# Patient Record
Sex: FEMALE | Race: WHITE | NOT HISPANIC OR LATINO
[De-identification: names, ages, dates, MRNs, and addresses within clinical notes are randomized per-mention and may not be internally consistent; named-entity substitution may affect disease eponyms.]

---

## 2020-10-05 ENCOUNTER — APPOINTMENT (OUTPATIENT)
Dept: COLORECTAL SURGERY | Facility: CLINIC | Age: 60
End: 2020-10-05

## 2020-10-27 ENCOUNTER — APPOINTMENT (OUTPATIENT)
Dept: COLORECTAL SURGERY | Facility: CLINIC | Age: 60
End: 2020-10-27

## 2020-11-05 ENCOUNTER — APPOINTMENT (OUTPATIENT)
Dept: COLORECTAL SURGERY | Facility: CLINIC | Age: 60
End: 2020-11-05
Payer: COMMERCIAL

## 2020-11-05 VITALS
HEART RATE: 74 BPM | SYSTOLIC BLOOD PRESSURE: 130 MMHG | BODY MASS INDEX: 21 KG/M2 | DIASTOLIC BLOOD PRESSURE: 60 MMHG | HEIGHT: 62 IN | OXYGEN SATURATION: 98 % | TEMPERATURE: 98.2 F | WEIGHT: 114.13 LBS

## 2020-11-05 PROCEDURE — 99213 OFFICE O/P EST LOW 20 MIN: CPT

## 2020-11-05 PROCEDURE — 99072 ADDL SUPL MATRL&STAF TM PHE: CPT

## 2020-11-05 RX ORDER — VALACYCLOVIR HYDROCHLORIDE 1 G/1
TABLET, FILM COATED ORAL
Refills: 0 | Status: ACTIVE | COMMUNITY

## 2020-11-05 NOTE — PHYSICAL EXAM
[FreeTextEntry1] : PE:\par \par AOA, looks well\par chest: CTA bilaterally,\par CV: s1s2, RRR\par abdomen: soft, flat, non tender. \par ext: no edema, no calf tenderness

## 2020-11-05 NOTE — HISTORY OF PRESENT ILLNESS
[FreeTextEntry1] : 60 year old female with  history of serrated adenoma in  February of 2018. \par \par No issues no change in bowel habits, no no bleeding , no bellly pain ,  no unexplained weight loss. \par \par

## 2020-11-14 ENCOUNTER — LABORATORY RESULT (OUTPATIENT)
Age: 60
End: 2020-11-14

## 2020-11-18 ENCOUNTER — APPOINTMENT (OUTPATIENT)
Dept: COLORECTAL SURGERY | Facility: AMBULATORY SURGERY CENTER | Age: 60
End: 2020-11-18
Payer: COMMERCIAL

## 2020-11-18 PROCEDURE — 45380 COLONOSCOPY AND BIOPSY: CPT

## 2020-12-14 ENCOUNTER — APPOINTMENT (OUTPATIENT)
Dept: INTERNAL MEDICINE | Facility: CLINIC | Age: 60
End: 2020-12-14
Payer: COMMERCIAL

## 2020-12-14 VITALS
HEIGHT: 62 IN | DIASTOLIC BLOOD PRESSURE: 64 MMHG | BODY MASS INDEX: 21.16 KG/M2 | WEIGHT: 115 LBS | TEMPERATURE: 97.6 F | OXYGEN SATURATION: 97 % | HEART RATE: 75 BPM | SYSTOLIC BLOOD PRESSURE: 130 MMHG

## 2020-12-14 DIAGNOSIS — Z78.9 OTHER SPECIFIED HEALTH STATUS: ICD-10-CM

## 2020-12-14 DIAGNOSIS — Z87.01 PERSONAL HISTORY OF PNEUMONIA (RECURRENT): ICD-10-CM

## 2020-12-14 DIAGNOSIS — Z82.49 FAMILY HISTORY OF ISCHEMIC HEART DISEASE AND OTHER DISEASES OF THE CIRCULATORY SYSTEM: ICD-10-CM

## 2020-12-14 DIAGNOSIS — O26.839 PREGNANCY RELATED RENAL DISEASE, UNSPECIFIED TRIMESTER: ICD-10-CM

## 2020-12-14 DIAGNOSIS — N20.0 PREGNANCY RELATED RENAL DISEASE, UNSPECIFIED TRIMESTER: ICD-10-CM

## 2020-12-14 DIAGNOSIS — Z82.3 FAMILY HISTORY OF STROKE: ICD-10-CM

## 2020-12-14 DIAGNOSIS — A60.00 HERPESVIRAL INFECTION OF UROGENITAL SYSTEM, UNSPECIFIED: ICD-10-CM

## 2020-12-14 PROCEDURE — 99072 ADDL SUPL MATRL&STAF TM PHE: CPT

## 2020-12-14 PROCEDURE — 99203 OFFICE O/P NEW LOW 30 MIN: CPT

## 2020-12-14 RX ORDER — SODIUM FLUORIDE 6 MG/ML
1.1 PASTE, DENTIFRICE DENTAL
Qty: 200 | Refills: 0 | Status: COMPLETED | COMMUNITY
Start: 2020-08-10

## 2020-12-14 RX ORDER — ZOLPIDEM TARTRATE 5 MG/1
5 TABLET ORAL
Qty: 30 | Refills: 0 | Status: COMPLETED | COMMUNITY
Start: 2020-11-17

## 2020-12-14 RX ORDER — POLYETHYLENE GLYCOL 3350, SODIUM CHLORIDE, SODIUM BICARBONATE AND POTASSIUM CHLORIDE WITH LEMON FLAVOR 420; 11.2; 5.72; 1.48 G/4L; G/4L; G/4L; G/4L
420 POWDER, FOR SOLUTION ORAL
Qty: 1 | Refills: 0 | Status: DISCONTINUED | COMMUNITY
Start: 2020-11-05 | End: 2020-12-14

## 2020-12-14 RX ORDER — PIMECROLIMUS 10 MG/G
1 CREAM TOPICAL
Qty: 30 | Refills: 0 | Status: COMPLETED | COMMUNITY
Start: 2020-09-24

## 2020-12-14 RX ORDER — METRONIDAZOLE 7.5 MG/G
0.75 CREAM TOPICAL
Qty: 45 | Refills: 0 | Status: COMPLETED | COMMUNITY
Start: 2020-09-24

## 2021-02-16 ENCOUNTER — TRANSCRIPTION ENCOUNTER (OUTPATIENT)
Age: 61
End: 2021-02-16

## 2021-02-16 ENCOUNTER — APPOINTMENT (OUTPATIENT)
Dept: INTERNAL MEDICINE | Facility: CLINIC | Age: 61
End: 2021-02-16
Payer: COMMERCIAL

## 2021-02-16 PROCEDURE — 99442: CPT

## 2021-02-16 RX ORDER — MULTIVITAMIN/IRON/FOLIC ACID 18MG-0.4MG
TABLET ORAL
Refills: 0 | Status: ACTIVE | COMMUNITY

## 2021-02-16 RX ORDER — CALCIUM CITRATE/VITAMIN D3 315MG-6.25
TABLET ORAL
Refills: 0 | Status: ACTIVE | COMMUNITY

## 2021-02-16 NOTE — HISTORY OF PRESENT ILLNESS
[Home] : at home, [unfilled] , at the time of the visit. [Medical Office: ([facility])___] : at the medical office located in  [Verbal consent obtained from patient] : the patient, [unfilled] [FreeTextEntry1] : review DEXA  [de-identified] : pt scheduled for Tele-video, but was unable to connect

## 2021-10-19 ENCOUNTER — NON-APPOINTMENT (OUTPATIENT)
Age: 61
End: 2021-10-19

## 2021-10-19 ENCOUNTER — INPATIENT (INPATIENT)
Facility: HOSPITAL | Age: 61
LOS: 1 days | Discharge: HOME | DRG: 342 | End: 2021-10-21
Attending: SURGERY | Admitting: SURGERY
Payer: COMMERCIAL

## 2021-10-19 ENCOUNTER — TRANSCRIPTION ENCOUNTER (OUTPATIENT)
Age: 61
End: 2021-10-19

## 2021-10-19 VITALS
DIASTOLIC BLOOD PRESSURE: 59 MMHG | TEMPERATURE: 98 F | SYSTOLIC BLOOD PRESSURE: 163 MMHG | OXYGEN SATURATION: 99 % | HEART RATE: 105 BPM | HEIGHT: 62 IN | WEIGHT: 110.01 LBS | RESPIRATION RATE: 20 BRPM

## 2021-10-19 LAB
ALBUMIN SERPL ELPH-MCNC: 4.5 G/DL — SIGNIFICANT CHANGE UP (ref 3.3–5)
ALP SERPL-CCNC: 77 U/L — SIGNIFICANT CHANGE UP (ref 40–120)
ALT FLD-CCNC: 13 U/L — SIGNIFICANT CHANGE UP (ref 10–45)
ANION GAP SERPL CALC-SCNC: 14 MMOL/L — SIGNIFICANT CHANGE UP (ref 5–17)
APPEARANCE UR: CLEAR — SIGNIFICANT CHANGE UP
APTT BLD: 30.2 SEC — SIGNIFICANT CHANGE UP (ref 27.5–35.5)
AST SERPL-CCNC: 24 U/L — SIGNIFICANT CHANGE UP (ref 10–40)
BACTERIA # UR AUTO: SIGNIFICANT CHANGE UP /HPF
BILIRUB SERPL-MCNC: 1.1 MG/DL — SIGNIFICANT CHANGE UP (ref 0.2–1.2)
BILIRUB UR-MCNC: NEGATIVE — SIGNIFICANT CHANGE UP
BUN SERPL-MCNC: 5 MG/DL — LOW (ref 7–23)
CALCIUM SERPL-MCNC: 9.3 MG/DL — SIGNIFICANT CHANGE UP (ref 8.4–10.5)
CHLORIDE SERPL-SCNC: 94 MMOL/L — LOW (ref 96–108)
CO2 SERPL-SCNC: 21 MMOL/L — LOW (ref 22–31)
COLOR SPEC: YELLOW — SIGNIFICANT CHANGE UP
CREAT SERPL-MCNC: 0.54 MG/DL — SIGNIFICANT CHANGE UP (ref 0.5–1.3)
DIFF PNL FLD: ABNORMAL
EPI CELLS # UR: SIGNIFICANT CHANGE UP /HPF (ref 0–5)
GLUCOSE SERPL-MCNC: 117 MG/DL — HIGH (ref 70–99)
GLUCOSE UR QL: NEGATIVE — SIGNIFICANT CHANGE UP
HCT VFR BLD CALC: 32 % — LOW (ref 34.5–45)
HGB BLD-MCNC: 11 G/DL — LOW (ref 11.5–15.5)
INR BLD: 1.08 — SIGNIFICANT CHANGE UP (ref 0.88–1.16)
KETONES UR-MCNC: >=80 MG/DL
LACTATE SERPL-SCNC: 1 MMOL/L — SIGNIFICANT CHANGE UP (ref 0.5–2)
LEUKOCYTE ESTERASE UR-ACNC: ABNORMAL
LIDOCAIN IGE QN: 13 U/L — SIGNIFICANT CHANGE UP (ref 7–60)
MCHC RBC-ENTMCNC: 32.4 PG — SIGNIFICANT CHANGE UP (ref 27–34)
MCHC RBC-ENTMCNC: 34.4 GM/DL — SIGNIFICANT CHANGE UP (ref 32–36)
MCV RBC AUTO: 94.1 FL — SIGNIFICANT CHANGE UP (ref 80–100)
NITRITE UR-MCNC: NEGATIVE — SIGNIFICANT CHANGE UP
NRBC # BLD: 0 /100 WBCS — SIGNIFICANT CHANGE UP (ref 0–0)
PH UR: 8 — SIGNIFICANT CHANGE UP (ref 5–8)
PLATELET # BLD AUTO: 242 K/UL — SIGNIFICANT CHANGE UP (ref 150–400)
POTASSIUM SERPL-MCNC: 4.2 MMOL/L — SIGNIFICANT CHANGE UP (ref 3.5–5.3)
POTASSIUM SERPL-SCNC: 4.2 MMOL/L — SIGNIFICANT CHANGE UP (ref 3.5–5.3)
PROT SERPL-MCNC: 7.6 G/DL — SIGNIFICANT CHANGE UP (ref 6–8.3)
PROT UR-MCNC: NEGATIVE MG/DL — SIGNIFICANT CHANGE UP
PROTHROM AB SERPL-ACNC: 12.9 SEC — SIGNIFICANT CHANGE UP (ref 10.6–13.6)
RBC # BLD: 3.4 M/UL — LOW (ref 3.8–5.2)
RBC # FLD: 11.8 % — SIGNIFICANT CHANGE UP (ref 10.3–14.5)
RBC CASTS # UR COMP ASSIST: < 5 /HPF — SIGNIFICANT CHANGE UP
SARS-COV-2 RNA SPEC QL NAA+PROBE: NEGATIVE — SIGNIFICANT CHANGE UP
SODIUM SERPL-SCNC: 129 MMOL/L — LOW (ref 135–145)
SP GR SPEC: 1.02 — SIGNIFICANT CHANGE UP (ref 1–1.03)
UROBILINOGEN FLD QL: 0.2 E.U./DL — SIGNIFICANT CHANGE UP
WBC # BLD: 15.75 K/UL — HIGH (ref 3.8–10.5)
WBC # FLD AUTO: 15.75 K/UL — HIGH (ref 3.8–10.5)
WBC UR QL: < 5 /HPF — SIGNIFICANT CHANGE UP

## 2021-10-19 PROCEDURE — 74177 CT ABD & PELVIS W/CONTRAST: CPT | Mod: 26,MG

## 2021-10-19 PROCEDURE — G1004: CPT

## 2021-10-19 PROCEDURE — 99285 EMERGENCY DEPT VISIT HI MDM: CPT

## 2021-10-19 RX ORDER — ACETAMINOPHEN 500 MG
650 TABLET ORAL ONCE
Refills: 0 | Status: DISCONTINUED | OUTPATIENT
Start: 2021-10-19 | End: 2021-10-19

## 2021-10-19 RX ORDER — METRONIDAZOLE 500 MG
500 TABLET ORAL ONCE
Refills: 0 | Status: COMPLETED | OUTPATIENT
Start: 2021-10-19 | End: 2021-10-19

## 2021-10-19 RX ORDER — CEFTRIAXONE 500 MG/1
1000 INJECTION, POWDER, FOR SOLUTION INTRAMUSCULAR; INTRAVENOUS ONCE
Refills: 0 | Status: COMPLETED | OUTPATIENT
Start: 2021-10-19 | End: 2021-10-19

## 2021-10-19 RX ORDER — ACETAMINOPHEN 500 MG
1000 TABLET ORAL ONCE
Refills: 0 | Status: DISCONTINUED | OUTPATIENT
Start: 2021-10-19 | End: 2021-10-20

## 2021-10-19 RX ORDER — HEPARIN SODIUM 5000 [USP'U]/ML
5000 INJECTION INTRAVENOUS; SUBCUTANEOUS EVERY 8 HOURS
Refills: 0 | Status: DISCONTINUED | OUTPATIENT
Start: 2021-10-19 | End: 2021-10-20

## 2021-10-19 RX ORDER — FAMOTIDINE 10 MG/ML
20 INJECTION INTRAVENOUS ONCE
Refills: 0 | Status: COMPLETED | OUTPATIENT
Start: 2021-10-19 | End: 2021-10-19

## 2021-10-19 RX ORDER — ACETAMINOPHEN 500 MG
650 TABLET ORAL ONCE
Refills: 0 | Status: COMPLETED | OUTPATIENT
Start: 2021-10-19 | End: 2021-10-19

## 2021-10-19 RX ORDER — SODIUM CHLORIDE 9 MG/ML
1000 INJECTION INTRAMUSCULAR; INTRAVENOUS; SUBCUTANEOUS ONCE
Refills: 0 | Status: COMPLETED | OUTPATIENT
Start: 2021-10-19 | End: 2021-10-19

## 2021-10-19 RX ORDER — ONDANSETRON 8 MG/1
4 TABLET, FILM COATED ORAL ONCE
Refills: 0 | Status: COMPLETED | OUTPATIENT
Start: 2021-10-19 | End: 2021-10-19

## 2021-10-19 RX ORDER — SODIUM CHLORIDE 9 MG/ML
1000 INJECTION INTRAMUSCULAR; INTRAVENOUS; SUBCUTANEOUS
Refills: 0 | Status: DISCONTINUED | OUTPATIENT
Start: 2021-10-19 | End: 2021-10-20

## 2021-10-19 RX ORDER — ONDANSETRON 8 MG/1
4 TABLET, FILM COATED ORAL EVERY 6 HOURS
Refills: 0 | Status: DISCONTINUED | OUTPATIENT
Start: 2021-10-19 | End: 2021-10-20

## 2021-10-19 RX ADMIN — Medication 650 MILLIGRAM(S): at 23:29

## 2021-10-19 RX ADMIN — FAMOTIDINE 20 MILLIGRAM(S): 10 INJECTION INTRAVENOUS at 18:13

## 2021-10-19 RX ADMIN — CEFTRIAXONE 1000 MILLIGRAM(S): 500 INJECTION, POWDER, FOR SOLUTION INTRAMUSCULAR; INTRAVENOUS at 23:37

## 2021-10-19 RX ADMIN — Medication 650 MILLIGRAM(S): at 19:15

## 2021-10-19 RX ADMIN — SODIUM CHLORIDE 1000 MILLILITER(S): 9 INJECTION INTRAMUSCULAR; INTRAVENOUS; SUBCUTANEOUS at 18:05

## 2021-10-19 RX ADMIN — Medication 650 MILLIGRAM(S): at 18:12

## 2021-10-19 RX ADMIN — SODIUM CHLORIDE 1000 MILLILITER(S): 9 INJECTION INTRAMUSCULAR; INTRAVENOUS; SUBCUTANEOUS at 19:05

## 2021-10-19 RX ADMIN — Medication 100 MILLIGRAM(S): at 23:37

## 2021-10-19 RX ADMIN — CEFTRIAXONE 100 MILLIGRAM(S): 500 INJECTION, POWDER, FOR SOLUTION INTRAMUSCULAR; INTRAVENOUS at 23:15

## 2021-10-19 RX ADMIN — Medication 650 MILLIGRAM(S): at 22:40

## 2021-10-19 RX ADMIN — ONDANSETRON 4 MILLIGRAM(S): 8 TABLET, FILM COATED ORAL at 18:13

## 2021-10-19 NOTE — ED PROVIDER NOTE - PROGRESS NOTE DETAILS
ct showed acute appy, abx given, pain controlled, preop ordered, surgery consulted. will admit dr momo DE LA TORRE

## 2021-10-19 NOTE — H&P ADULT - NSHPPHYSICALEXAM_GEN_ALL_CORE
Vital Signs Last 24 Hrs  T(C): 37.2 (19 Oct 2021 23:28), Max: 37.2 (19 Oct 2021 23:28)  T(F): 98.9 (19 Oct 2021 23:28), Max: 98.9 (19 Oct 2021 23:28)  HR: 87 (19 Oct 2021 23:28) (87 - 105)  BP: 147/82 (19 Oct 2021 23:28) (147/82 - 163/59)  BP(mean): --  RR: 16 (19 Oct 2021 21:50) (16 - 20)  SpO2: 98% (19 Oct 2021 21:50) (98% - 99%)  I&O's Detail    PHYSICAL EXAM:   General: NAD, resting comfortably in bed  C/V: NSR  Pulm: Nonlabored breathing, no respiratory distress  Abd: soft, mildly distended, moderately TTP in lower quadrants, moderate TTP at McBurney's point. Rebound +. No guarding.   Extrem: WWP, no edema  Skin: warm, no rashes  Psych: calm, appropriate

## 2021-10-19 NOTE — ED ADULT NURSE NOTE - NSIMPLEMENTINTERV_GEN_ALL_ED
History & Physical Examination     Patient Name: Hilton Ruiz  MRN: [id number]  CSN: [id number]  YOB: 1947     Diagnosis: REFRACTORY FREQUENCY, URGENCY WITH INCONTINENCE     Present Illness: REFRACTORY FREQUENCY, URGENCY WITH Jen Stoner Cancer Brother         Throat   • Hypertension Brother     • Cancer Brother         Prostate   • Hypertension Brother                        Smoking status: Never Smoker              Smokeless tobacco: Never Used            Alcohol use Yes     Comment: soc Implemented All Universal Safety Interventions:  [place] to call system. Call bell, personal items and telephone within reach. Instruct patient to call for assistance. Room bathroom lighting operational. Non-slip footwear when patient is off stretcher. Physically safe environment: no spills, clutter or unnecessary equipment. Stretcher in lowest position, wheels locked, appropriate side rails in place.

## 2021-10-19 NOTE — ED ADULT TRIAGE NOTE - CHIEF COMPLAINT QUOTE
Pt presents to ED C/O 4/10 R lower abdominal pain starting last night x 2 episodes of emesis and frequent watery stools. Pt referred to [facility] ER from Urgent Care for R/O appendicitis. Denies fever/ CP.

## 2021-10-19 NOTE — ED PROVIDER NOTE - NS_BEDUNITTYPES_ED_ALL_ED
-- restart symbicort    -- continue prednisone taper    -- tylenol with codeine for severe cough - limit use    -- if cough not improving - check chest xray - especially before weekend
REGIONAL

## 2021-10-19 NOTE — ED PROVIDER NOTE - PHYSICAL EXAMINATION
CONSTITUTIONAL: Well-appearing; in no apparent distress.   	HEAD: Normocephalic; atraumatic.   	EYES:  conjunctiva and sclera clear  	ENT: normal nose; no rhinorrhea; normal pharynx with no erythema or lesions.   	NECK: Supple; non-tender;   	CARDIOVASCULAR: Normal S1, S2; no murmurs, rubs, or gallops. Regular rate and rhythm.   	RESPIRATORY: Breathing easily; breath sounds clear and equal bilaterally; no wheezes, rhonchi, or rales.  	GI: No CVA tenderness. Mild LLQ tenderness. Severe RLQ tenderness. Mild guarding. No rebound.   	EXT: No cyanosis or edema; N/V intact  	SKIN: Normal for age and race; warm; dry; good turgor; no apparent lesions or rash.   	NEURO: A & O x 3; face symmetric; grossly unremarkable.   PSYCHOLOGICAL: The patient’s mood and manner are appropriate.

## 2021-10-19 NOTE — H&P ADULT - HISTORY OF PRESENT ILLNESS
HPI:   Alexandra Blank is a 61F pmh of HSV, osphena and psh of diagnostic laparoscopy who presents with 2 day history of abdominal pain with associated nausea with emesis. Reporting onset of jeffy-umbilical, crampy abdominal pain yesterday morning that was persistent with radiation to the pelvis and back. Also reports frequent BMs with abdominal pain. Pain persisted throughout today migrating to the RLQ with associated nausea and emesis x2; NBNB, and sought care at Urgent Care. Was recommended to go to the ED for concern of acute appendicitis. Has had multiple colonoscopies, last one was 2 years ago and notable for multiple polyps that were resected-precancerous, otherwise wnl. Denies f/c, CP, SOB, melena, hematochezia, dysuria, hematuria, weakness or pain in extremities.     In th ED initial vitals were notable for  and /59, otherwise remaining vitals wnl. Initial labs notable for WBC 15.7 and Na 129. CT A/P distended appendix measuring 1.4 cm with fat stranding and thickened walls and without collection or free air. Was bolused 1L NS.     PMH: HSV  PSH: Diagnostic laparoscopy  Allergies: codeine  Medications: Valacyclovir, osphena  SocHx: No h/o of tobacco use, occasional EtOH use.   FamHx: No h/o of CRC and IBD    heparin   Injectable 5000 Unit(s) SubCutaneous every 8 hours

## 2021-10-19 NOTE — H&P ADULT - NSHPLABSRESULTS_GEN_ALL_CORE
LABS:                        11.0   15.75 )-----------( 242      ( 19 Oct 2021 18:10 )             32.0     10-    129<L>  |  94<L>  |  5<L>  ----------------------------<  117<H>  4.2   |  21<L>  |  0.54    Ca    9.3      19 Oct 2021 18:10    TPro  7.6  /  Alb  4.5  /  TBili  1.1  /  DBili  x   /  AST  24  /  ALT  13  /  AlkPhos  77  10-19    PT/INR - ( 19 Oct 2021 23:16 )   PT: 12.9 sec;   INR: 1.08          PTT - ( 19 Oct 2021 23:16 )  PTT:30.2 sec  Urinalysis Basic - ( 19 Oct 2021 19:11 )    Color: Yellow / Appearance: Clear / S.020 / pH: x  Gluc: x / Ketone: >=80 mg/dL  / Bili: Negative / Urobili: 0.2 E.U./dL   Blood: x / Protein: NEGATIVE mg/dL / Nitrite: NEGATIVE   Leuk Esterase: Trace / RBC: < 5 /HPF / WBC < 5 /HPF   Sq Epi: x / Non Sq Epi: 0-5 /HPF / Bacteria: None /HPF        RADIOLOGY & ADDITIONAL STUDIES:  < from: CT Abdomen and Pelvis w/ IV Cont (10.19.21 @ 21:37) >      ******PRELIMINARY REPORT******    ******PRELIMINARY REPORT******            EXAM:  CT ABDOMEN AND PELVIS IC                          PROCEDURE DATE:  10/19/2021    ******PRELIMINARY REPORT******    ******PRELIMINARY REPORT******              INTERPRETATION:  CT SCAN OF ABDOMEN AND PELVIS    History: Right lower quadrant pain.    Technique: CT scan of abdomen and pelvis was performed from lung bases through symphysis pubis. Intravenous and oral contrast material were utilized. Axial, sagittal and coronal reformatted images were reviewed.    Comparison: None.    Findings:    Lower chest: Normal.    Liver:  Normal.    Gallbladder: No radiopaque stones gallbladder.    Spleen:  Normal.    Pancreas:  Normal.    Adrenal glands:  Normal.    Kidneys: Normal.    Adenopathy:  Right-sided mesenteric adenopathy.    Ascites: None.    Gastrointestinal tract: Thickened distended appendix measuring about 1.4 cm with thickened walls and filled with fluid and some air. There is significant surrounding fat-stranding. No collection or free air. No obstruction identified.    Vessels: Normal.    Pelvic organs: Distended bladder.    Soft tissues: Normal.    Bones: Normal.    Impression:    1.  Distended appendix measuring 1.4 cm with fat stranding and thickened walls and without collection or free air. Findings are consistent with acute uncomplicated appendicitis.  2.  Distended bladder.        Thank you for the opportunity to participate in the care of this patient.  ******PRELIMINARY REPORT******    ******PRELIMINARY REPORT******          KHALED AL TAWIL MD; Resident Radiologist

## 2021-10-19 NOTE — ED ADULT NURSE REASSESSMENT NOTE - NS ED NURSE REASSESS COMMENT FT1
PAtient requested Tylenol for 5/10 pain, see EMAR.  Surgery team at the bedside.
pt awaiting CT results.

## 2021-10-19 NOTE — ED PROVIDER NOTE - OBJECTIVE STATEMENT
60 y/o F, no PMHx. Ate soup dumplings for dinner last night at 8-9PM and at 2AM today developed abdominal pain and had 4-5 episodes of nonbloody, nonbilious vomiting throughout the day. Abdominal pain and nausea has since worsened. Pt is unable to keep down PO. She went to urgent care and was referred to the ED for evaluation of appendicitis. No fevers, chills, diarrhea, chest pain, SOB. No Recent travel or sick contacts.

## 2021-10-19 NOTE — ED PROVIDER NOTE - NS ED ROS FT
Other than symptoms associated with present events the following is reported:  General:  No fever, no chills, no weight loss.  HEENT:  No sore throat.  Respiratory: No cough, no dyspnea, no wheeze. no SOB.  Cardiovascular:  No chest pain, no palpitations, no orthopnea.  GI: + abdominal pain, nausea, vomiting. no diarrhea.   : No dysuria, no frequency, no urgency.  Musculoskeletal:  No joint pain, no myalgia.  Endocrine:  No generalized weakness, no polyuria.  Neurological:  No headache, no focal weakness.   Psychiatric: No emotional stress, no depression.  Derm:  No rash.  Heme:  No bruising, no bleeding.

## 2021-10-19 NOTE — H&P ADULT - ASSESSMENT
61F on HRT and pmh of HSV and psh of diagnostic laparoscopy who presents with 2 day history of abdominal pain with associated nausea with emesis. In th ED initial vitals were notable for  and /59, otherwise remaining vitals wnl. Initial labs notable for WBC 15.7 and Na 129. CT A/P distended appendix measuring 1.4 cm with fat stranding and thickened walls and without collection or free air. Clinical and radiographic findings c/w acute uncomplicated appendicitis.     Admit to surgery team 4 under Dr. Santiago  Add on for laparoscopic appendectomy  NPO/IVF NS @ 90cc/hr  CTX/Flagyl Stat  F/u AM labs  Analgesics PRN  Antiemetics PRN  F/u CXR  Stat T+S  VTE PPX with SCDs and SQH  Discussed with chief surgery resident

## 2021-10-19 NOTE — ED ADULT NURSE NOTE - OBJECTIVE STATEMENT
patient presented with abdominal pain since yesterday. stated she had about 25 bowel movement. Also stated she went to the urgent care center and was sent to the emergency room. will continue to monitor.

## 2021-10-19 NOTE — ED PROVIDER NOTE - CLINICAL SUMMARY MEDICAL DECISION MAKING FREE TEXT BOX
62 y/o F presents with abdominal pain, nausea, and multiple episodes of vomiting after eating soup dumplings last night. Will obtain labs, CT to r/o appendicitis vs gastroenteritis.

## 2021-10-20 ENCOUNTER — RESULT REVIEW (OUTPATIENT)
Age: 61
End: 2021-10-20

## 2021-10-20 DIAGNOSIS — Z98.890 OTHER SPECIFIED POSTPROCEDURAL STATES: Chronic | ICD-10-CM

## 2021-10-20 LAB
ANION GAP SERPL CALC-SCNC: 12 MMOL/L — SIGNIFICANT CHANGE UP (ref 5–17)
BLD GP AB SCN SERPL QL: NEGATIVE — SIGNIFICANT CHANGE UP
BUN SERPL-MCNC: 5 MG/DL — LOW (ref 7–23)
CALCIUM SERPL-MCNC: 8.8 MG/DL — SIGNIFICANT CHANGE UP (ref 8.4–10.5)
CHLORIDE SERPL-SCNC: 101 MMOL/L — SIGNIFICANT CHANGE UP (ref 96–108)
CO2 SERPL-SCNC: 21 MMOL/L — LOW (ref 22–31)
COVID-19 SPIKE DOMAIN AB INTERP: POSITIVE
COVID-19 SPIKE DOMAIN ANTIBODY RESULT: >250 U/ML — HIGH
CREAT SERPL-MCNC: 0.6 MG/DL — SIGNIFICANT CHANGE UP (ref 0.5–1.3)
GLUCOSE SERPL-MCNC: 89 MG/DL — SIGNIFICANT CHANGE UP (ref 70–99)
HCT VFR BLD CALC: 29.5 % — LOW (ref 34.5–45)
HCV AB S/CO SERPL IA: 0.04 S/CO — SIGNIFICANT CHANGE UP
HCV AB SERPL-IMP: SIGNIFICANT CHANGE UP
HGB BLD-MCNC: 10 G/DL — LOW (ref 11.5–15.5)
MAGNESIUM SERPL-MCNC: 2.2 MG/DL — SIGNIFICANT CHANGE UP (ref 1.6–2.6)
MCHC RBC-ENTMCNC: 32.5 PG — SIGNIFICANT CHANGE UP (ref 27–34)
MCHC RBC-ENTMCNC: 33.9 GM/DL — SIGNIFICANT CHANGE UP (ref 32–36)
MCV RBC AUTO: 95.8 FL — SIGNIFICANT CHANGE UP (ref 80–100)
NRBC # BLD: 0 /100 WBCS — SIGNIFICANT CHANGE UP (ref 0–0)
PHOSPHATE SERPL-MCNC: 3.2 MG/DL — SIGNIFICANT CHANGE UP (ref 2.5–4.5)
PLATELET # BLD AUTO: 222 K/UL — SIGNIFICANT CHANGE UP (ref 150–400)
POTASSIUM SERPL-MCNC: 4 MMOL/L — SIGNIFICANT CHANGE UP (ref 3.5–5.3)
POTASSIUM SERPL-SCNC: 4 MMOL/L — SIGNIFICANT CHANGE UP (ref 3.5–5.3)
RBC # BLD: 3.08 M/UL — LOW (ref 3.8–5.2)
RBC # FLD: 12.2 % — SIGNIFICANT CHANGE UP (ref 10.3–14.5)
RH IG SCN BLD-IMP: POSITIVE — SIGNIFICANT CHANGE UP
SARS-COV-2 IGG+IGM SERPL QL IA: >250 U/ML — HIGH
SARS-COV-2 IGG+IGM SERPL QL IA: POSITIVE
SODIUM SERPL-SCNC: 134 MMOL/L — LOW (ref 135–145)
WBC # BLD: 15.97 K/UL — HIGH (ref 3.8–10.5)
WBC # FLD AUTO: 15.97 K/UL — HIGH (ref 3.8–10.5)

## 2021-10-20 PROCEDURE — 99223 1ST HOSP IP/OBS HIGH 75: CPT

## 2021-10-20 PROCEDURE — 88304 TISSUE EXAM BY PATHOLOGIST: CPT | Mod: 26

## 2021-10-20 PROCEDURE — 71045 X-RAY EXAM CHEST 1 VIEW: CPT | Mod: 26

## 2021-10-20 RX ORDER — METRONIDAZOLE 500 MG
500 TABLET ORAL EVERY 8 HOURS
Refills: 0 | Status: DISCONTINUED | OUTPATIENT
Start: 2021-10-20 | End: 2021-10-20

## 2021-10-20 RX ORDER — HEPARIN SODIUM 5000 [USP'U]/ML
5000 INJECTION INTRAVENOUS; SUBCUTANEOUS EVERY 8 HOURS
Refills: 0 | Status: DISCONTINUED | OUTPATIENT
Start: 2021-10-20 | End: 2021-10-21

## 2021-10-20 RX ORDER — ONDANSETRON 8 MG/1
4 TABLET, FILM COATED ORAL EVERY 6 HOURS
Refills: 0 | Status: DISCONTINUED | OUTPATIENT
Start: 2021-10-20 | End: 2021-10-21

## 2021-10-20 RX ORDER — HYDROMORPHONE HYDROCHLORIDE 2 MG/ML
0.25 INJECTION INTRAMUSCULAR; INTRAVENOUS; SUBCUTANEOUS
Refills: 0 | Status: DISCONTINUED | OUTPATIENT
Start: 2021-10-20 | End: 2021-10-21

## 2021-10-20 RX ORDER — VALACYCLOVIR 500 MG/1
0 TABLET, FILM COATED ORAL
Qty: 0 | Refills: 0 | DISCHARGE

## 2021-10-20 RX ORDER — INFLUENZA VIRUS VACCINE 15; 15; 15; 15 UG/.5ML; UG/.5ML; UG/.5ML; UG/.5ML
0.5 SUSPENSION INTRAMUSCULAR ONCE
Refills: 0 | Status: DISCONTINUED | OUTPATIENT
Start: 2021-10-20 | End: 2021-10-21

## 2021-10-20 RX ORDER — ZALEPLON 10 MG
5 CAPSULE ORAL AT BEDTIME
Refills: 0 | Status: DISCONTINUED | OUTPATIENT
Start: 2021-10-20 | End: 2021-10-20

## 2021-10-20 RX ORDER — ZOLPIDEM TARTRATE 10 MG/1
5 TABLET ORAL AT BEDTIME
Refills: 0 | Status: DISCONTINUED | OUTPATIENT
Start: 2021-10-20 | End: 2021-10-21

## 2021-10-20 RX ORDER — METRONIDAZOLE 500 MG
500 TABLET ORAL EVERY 8 HOURS
Refills: 0 | Status: DISCONTINUED | OUTPATIENT
Start: 2021-10-20 | End: 2021-10-21

## 2021-10-20 RX ORDER — CEFTRIAXONE 500 MG/1
1000 INJECTION, POWDER, FOR SOLUTION INTRAMUSCULAR; INTRAVENOUS EVERY 24 HOURS
Refills: 0 | Status: DISCONTINUED | OUTPATIENT
Start: 2021-10-20 | End: 2021-10-21

## 2021-10-20 RX ORDER — LANOLIN ALCOHOL/MO/W.PET/CERES
5 CREAM (GRAM) TOPICAL AT BEDTIME
Refills: 0 | Status: DISCONTINUED | OUTPATIENT
Start: 2021-10-20 | End: 2021-10-20

## 2021-10-20 RX ORDER — ACETAMINOPHEN 500 MG
650 TABLET ORAL EVERY 6 HOURS
Refills: 0 | Status: DISCONTINUED | OUTPATIENT
Start: 2021-10-20 | End: 2021-10-20

## 2021-10-20 RX ORDER — OSPEMIFENE 60 MG/1
0 TABLET, FILM COATED ORAL
Qty: 0 | Refills: 0 | DISCHARGE

## 2021-10-20 RX ORDER — SODIUM CHLORIDE 9 MG/ML
1000 INJECTION, SOLUTION INTRAVENOUS
Refills: 0 | Status: DISCONTINUED | OUTPATIENT
Start: 2021-10-20 | End: 2021-10-20

## 2021-10-20 RX ORDER — HYDROMORPHONE HYDROCHLORIDE 2 MG/ML
0.25 INJECTION INTRAMUSCULAR; INTRAVENOUS; SUBCUTANEOUS
Refills: 0 | Status: DISCONTINUED | OUTPATIENT
Start: 2021-10-20 | End: 2021-10-20

## 2021-10-20 RX ORDER — OXYCODONE HYDROCHLORIDE 5 MG/1
5 TABLET ORAL EVERY 6 HOURS
Refills: 0 | Status: DISCONTINUED | OUTPATIENT
Start: 2021-10-20 | End: 2021-10-20

## 2021-10-20 RX ORDER — CEFTRIAXONE 500 MG/1
1000 INJECTION, POWDER, FOR SOLUTION INTRAMUSCULAR; INTRAVENOUS EVERY 24 HOURS
Refills: 0 | Status: CANCELLED | OUTPATIENT
Start: 2021-10-21 | End: 2021-10-20

## 2021-10-20 RX ORDER — ACETAMINOPHEN 500 MG
750 TABLET ORAL ONCE
Refills: 0 | Status: COMPLETED | OUTPATIENT
Start: 2021-10-20 | End: 2021-10-20

## 2021-10-20 RX ORDER — OXYCODONE HYDROCHLORIDE 5 MG/1
10 TABLET ORAL EVERY 6 HOURS
Refills: 0 | Status: DISCONTINUED | OUTPATIENT
Start: 2021-10-20 | End: 2021-10-20

## 2021-10-20 RX ORDER — ACETAMINOPHEN 500 MG
650 TABLET ORAL EVERY 6 HOURS
Refills: 0 | Status: DISCONTINUED | OUTPATIENT
Start: 2021-10-20 | End: 2021-10-21

## 2021-10-20 RX ADMIN — Medication 650 MILLIGRAM(S): at 19:15

## 2021-10-20 RX ADMIN — Medication 300 MILLIGRAM(S): at 07:47

## 2021-10-20 RX ADMIN — Medication 5 MILLIGRAM(S): at 01:58

## 2021-10-20 RX ADMIN — Medication 100 MILLIGRAM(S): at 06:24

## 2021-10-20 RX ADMIN — Medication 100 MILLIGRAM(S): at 21:01

## 2021-10-20 RX ADMIN — CEFTRIAXONE 100 MILLIGRAM(S): 500 INJECTION, POWDER, FOR SOLUTION INTRAMUSCULAR; INTRAVENOUS at 22:27

## 2021-10-20 RX ADMIN — Medication 750 MILLIGRAM(S): at 08:00

## 2021-10-20 RX ADMIN — Medication 650 MILLIGRAM(S): at 18:15

## 2021-10-20 RX ADMIN — SODIUM CHLORIDE 90 MILLILITER(S): 9 INJECTION INTRAMUSCULAR; INTRAVENOUS; SUBCUTANEOUS at 00:13

## 2021-10-20 RX ADMIN — HEPARIN SODIUM 5000 UNIT(S): 5000 INJECTION INTRAVENOUS; SUBCUTANEOUS at 00:13

## 2021-10-20 RX ADMIN — ZOLPIDEM TARTRATE 5 MILLIGRAM(S): 10 TABLET ORAL at 21:02

## 2021-10-20 RX ADMIN — HEPARIN SODIUM 5000 UNIT(S): 5000 INJECTION INTRAVENOUS; SUBCUTANEOUS at 06:24

## 2021-10-20 RX ADMIN — HEPARIN SODIUM 5000 UNIT(S): 5000 INJECTION INTRAVENOUS; SUBCUTANEOUS at 21:01

## 2021-10-20 NOTE — CONSULT NOTE ADULT - PROVIDER SPECIALTY LIST ADULT
Depo Provera 150mg given im ruoq without incident.  Pt tolerated injection well with no complaints.  Exp date -04/2022  Lot# [id number]  Pt instructed to return ~ 12/23/2020 for next injection.  Pt left ambulatory.   Hospitalist

## 2021-10-20 NOTE — CONSULT NOTE ADULT - SUBJECTIVE AND OBJECTIVE BOX
HPI:   Alexandra Blank is a 61F pmh of HSV, osphena and psh of diagnostic laparoscopy who presents with 2 day history of abdominal pain with associated nausea with emesis. Reporting onset of jeffy-umbilical, crampy abdominal pain the day before admission that was persistent with radiation to the pelvis and back. Also reports frequent BMs with abdominal pain. Pain persisted throughout the day, migrating to the RLQ with associated nausea and emesis x2; NBNB, and sought care at Urgent Care. Was recommended to go to the ED for concern of acute appendicitis. Has had multiple colonoscopies, last one was 2 years ago and notable for multiple polyps that were resected-precancerous, otherwise wnl. Denies f/c, CP, SOB, melena, hematochezia, dysuria, hematuria, weakness or pain in extremities.     In  ED initial vitals were notable for  and /59, otherwise remaining vitals wnl. Initial labs notable for WBC 15.7 and Na 129. CT A/P distended appendix measuring 1.4 cm with fat stranding and thickened walls and without collection or free air. Was bolused 1L NS.     At time of eval by internal medicine service today, was still having abdominal discomfort, awaiting transport for laparoscopic appendectomy.     PMH: HSV  PSH: Diagnostic laparoscopy  Allergies: codeine  Medications: Valacyclovir, osphena    heparin   Injectable 5000 Unit(s) SubCutaneous every 8 hours   (19 Oct 2021 23:42)      PAST MEDICAL & SURGICAL HISTORY:  HSV infection    History of laparoscopy  diagnostic        Home Medications:  Osphena:  (20 Oct 2021 00:00)  valACYclovir:  (20 Oct 2021 00:00)      Allergies    codeine (Vomiting)    Intolerances    FAMILY HISTORY:  FamHx: History of colorectal cancer in cousin - diagnosed at age 52; no colorectal cancer in father or mother     Social History:  No h/o of tobacco use, occasional EtOH use. (19 Oct 2021 23:42) (wine with dinner)  ; no recreational drug use      REVIEW OF SYSTEMS:  CONSTITUTIONAL: No weight loss  EYES: No eye pain, visual disturbances, or discharge  ENMT:  No difficulty hearing, tinnitus, vertigo; No throat pain  NECK: No pain or stiffness  RESPIRATORY: No cough, wheezing, or hemoptysis; No dyspnea  CARDIOVASCULAR: No chest pain, palpitations, dizziness, or leg swelling  GASTROINTESTINAL: Abdominal symptoms as per HPI.   GENITOURINARY: No dysuria, frequency, or hematuria  NEUROLOGICAL: No headaches, memory loss, numbness, or tremors  SKIN: No itching, burning, rashes, or lesions   LYMPH NODES: No enlarged glands  ENDOCRINE: No heat or cold intolerance;  MUSCULOSKELETAL: No joint pain or swelling;   HEME/LYMPH: No easy bruising, or bleeding gums  ALLERGY AND IMMUNOLOGIC: No hives or eczema    CURRENT MEDICATIONS:   acetaminophen   Tablet .. 650 milliGRAM(s) Oral every 6 hours PRN  cefTRIAXone   IVPB 1000 milliGRAM(s) IV Intermittent every 24 hours  heparin   Injectable 5000 Unit(s) SubCutaneous every 8 hours  HYDROmorphone  Injectable 0.25 milliGRAM(s) IV Push every 15 minutes PRN  influenza   Vaccine 0.5 milliLiter(s) IntraMuscular once  metroNIDAZOLE  IVPB 500 milliGRAM(s) IV Intermittent every 8 hours  ondansetron Injectable 4 milliGRAM(s) IV Push every 6 hours PRN  zolpidem 5 milliGRAM(s) Oral at bedtime PRN  zolpidem 5 milliGRAM(s) Oral at bedtime PRN      VITAL SIGNS, INS/OUTS (last 24 hours):  Vital Signs Last 24 Hrs  T(C): 36.8 (21 Oct 2021 00:45), Max: 37.6 (20 Oct 2021 05:07)  T(F): 98.2 (21 Oct 2021 00:45), Max: 99.6 (20 Oct 2021 05:07)  HR: 81 (21 Oct 2021 00:45) (80 - 107)  BP: 129/70 (21 Oct 2021 00:45) (105/57 - 140/69)  BP(mean): 89 (20 Oct 2021 15:30) (88 - 93)  RR: 15 (21 Oct 2021 00:45) (15 - 26)  SpO2: 97% (21 Oct 2021 00:45) (96% - 100%)  I&O's Summary    20 Oct 2021 07:01  -  21 Oct 2021 01:08  --------------------------------------------------------  IN: 0 mL / OUT: 650 mL / NET: -650 mL    PHYSICAL EXAM:  Gen: Reclining in bed at time of exam, appears stated age  HEENT: NCAT, MMM, clear OP  Neck: supple, trachea at midline  CV: RRR, +S1/S2  Pulm: adequate respiratory effort, no increase in work of breathing  Abd: soft, ND; non-obese;  most tender in RLQ  Skin: warm and dry, no new rashes vs prior report  Ext: WWP, no LE edema  Neuro: AOx3, no gross focal neurological deficits  Psych: affect and behavior appropriate, pleasant at time of interview    BASIC LABS:                        10.0   15.97 )-----------( 222      ( 20 Oct 2021 08:25 )             29.5     10-20    134<L>  |  101  |  5<L>  ----------------------------<  89  4.0   |  21<L>  |  0.60    Ca    8.8      20 Oct 2021 08:25  Phos  3.2     10-20  Mg     2.2     10-20    TPro  7.6  /  Alb  4.5  /  TBili  1.1  /  DBili  x   /  AST  24  /  ALT  13  /  AlkPhos  77  10-19    PT/INR - ( 19 Oct 2021 23:16 )   PT: 12.9 sec;   INR: 1.08          PTT - ( 19 Oct 2021 23:16 )  PTT:30.2 sec  Urinalysis Basic - ( 19 Oct 2021 19:11 )    Color: Yellow / Appearance: Clear / S.020 / pH: x  Gluc: x / Ketone: >=80 mg/dL  / Bili: Negative / Urobili: 0.2 E.U./dL   Blood: x / Protein: NEGATIVE mg/dL / Nitrite: NEGATIVE   Leuk Esterase: Trace / RBC: < 5 /HPF / WBC < 5 /HPF   Sq Epi: x / Non Sq Epi: 0-5 /HPF / Bacteria: None /HPF      CAPILLARY BLOOD GLUCOSE          OTHER LABS:        MICRODATA:    Culture - Blood (collected 20 Oct 2021 01:29)  Source: .Blood Blood  Preliminary Report (20 Oct 2021 14:00):    No growth at 12 hours    Culture - Blood (collected 20 Oct 2021 01:29)  Source: .Blood Blood  Preliminary Report (20 Oct 2021 14:00):    No growth at 12 hours        IMAGING:    EKG:    #Diet - Diet, Clear Liquid (10-20-21 @ 15:06) [Active]        #DVT PPx -  #Dispo -

## 2021-10-20 NOTE — PROGRESS NOTE ADULT - ASSESSMENT
61F on HRT , psh diagnostic lap for ovarian cyst, a/w acute uncomplicated appendicitis. Pt with expected abdominal tenderness when examined this AM, with plan for lap appy later today 10/20     Add on for laparoscopic appendectomy  NPO/IVF   CTX/Flagyl   F/u AM labs  Analgesics PRN  Antiemetics PRN  F/u CXR  VTE PPX with SCDs and SQH

## 2021-10-20 NOTE — CONSULT NOTE ADULT - PROBLEM SELECTOR RECOMMENDATION 4
Serum Na 129 at time of admission.   likely hypovolemic hyponatremia 2/2 poor PO intake, in the setting of acute appendicitis .   IVF for volume repletion.

## 2021-10-20 NOTE — PACU DISCHARGE NOTE - COMMENTS
Patient met PACU criteria. Vital signs stable. Patient comfortable while in PACU, no pain medication given. Lap sites X 3 clean and dry. Patient due to void by 9pm. Patient transported to [address] [place] [address] escorted by nursing assistants X 2

## 2021-10-20 NOTE — BRIEF OPERATIVE NOTE - OPERATION/FINDINGS
Abdomen accessed and insufflated with Veress needle. Infraumbilical cutdown with 12mm port. Additional two 5mm ports placed under direct visualization. Appendix gangrenous, laying in RLQ. Mesoderm dissected to appendiceal base and appendix take with RASHAUN stapler. Placed in EndoCatch and retrieved through infraumbilical incision. Abdomen irrigated and drained of pelvic fluid collections. Fascia closed. Skin closed. Sutures covered in dermabond.

## 2021-10-20 NOTE — CONSULT NOTE ADULT - PROBLEM SELECTOR RECOMMENDATION 3
# Pre-operative evaluation   METS >4 , able to walk up 1 flight of stairs without stopping.   RCRI Class II Risk  Using the MIRAMONTES jeffy-operative risk index, patient has a <1% risk of myocardial infarction or cardiac arrest, intraoperatively or up to 30 days post-op  Assuming laparoscopic appendectomy, patient is at low risk for an intermediate risk procedure.   Patient may proceed to surgery without further cardiac evaluation if surgery is indicated.     Please notify co-management hospitalist if patient's clinical status changes.

## 2021-10-20 NOTE — CONSULT NOTE ADULT - PROBLEM SELECTOR RECOMMENDATION 9
Clinical presentation and imaging consistent with acute appendicitis  Pending laparoscopic appendectomy.   Agree with empiric antibiotics   Rest of management per primary team.    post-operative care:   OOTB to chair   Encourage ambulation   Encourage incentive spirometry   Recommend miralax qd + senna PRN if patient develops constipation post-op

## 2021-10-20 NOTE — CONSULT NOTE ADULT - PROBLEM SELECTOR RECOMMENDATION 2
# Sepsis (present on admission)  At time of arrival to ED, met at least 2/4 SIRS criteria. WBCs 15.  bpm  Source: Likely 2/2 acute appendicitis  Improved with treatment of acute appendicitis as above

## 2021-10-20 NOTE — CONSULT NOTE ADULT - ASSESSMENT
61 year old woman with history of HSV, PSHx of diagnostic laparoscopy, who presented with abdominal pain. admitted for acute appendictis. Pending appendectomy.

## 2021-10-21 ENCOUNTER — TRANSCRIPTION ENCOUNTER (OUTPATIENT)
Age: 61
End: 2021-10-21

## 2021-10-21 VITALS — WEIGHT: 110.01 LBS

## 2021-10-21 DIAGNOSIS — Z01.818 ENCOUNTER FOR OTHER PREPROCEDURAL EXAMINATION: ICD-10-CM

## 2021-10-21 DIAGNOSIS — A41.9 SEPSIS, UNSPECIFIED ORGANISM: ICD-10-CM

## 2021-10-21 DIAGNOSIS — K35.80 UNSPECIFIED ACUTE APPENDICITIS: ICD-10-CM

## 2021-10-21 DIAGNOSIS — E87.1 HYPO-OSMOLALITY AND HYPONATREMIA: ICD-10-CM

## 2021-10-21 LAB
ANION GAP SERPL CALC-SCNC: 8 MMOL/L — SIGNIFICANT CHANGE UP (ref 5–17)
BUN SERPL-MCNC: 6 MG/DL — LOW (ref 7–23)
CALCIUM SERPL-MCNC: 8.4 MG/DL — SIGNIFICANT CHANGE UP (ref 8.4–10.5)
CHLORIDE SERPL-SCNC: 100 MMOL/L — SIGNIFICANT CHANGE UP (ref 96–108)
CO2 SERPL-SCNC: 22 MMOL/L — SIGNIFICANT CHANGE UP (ref 22–31)
CREAT SERPL-MCNC: 0.58 MG/DL — SIGNIFICANT CHANGE UP (ref 0.5–1.3)
GLUCOSE SERPL-MCNC: 114 MG/DL — HIGH (ref 70–99)
HCT VFR BLD CALC: 26 % — LOW (ref 34.5–45)
HGB BLD-MCNC: 9 G/DL — LOW (ref 11.5–15.5)
MAGNESIUM SERPL-MCNC: 2 MG/DL — SIGNIFICANT CHANGE UP (ref 1.6–2.6)
MCHC RBC-ENTMCNC: 33.5 PG — SIGNIFICANT CHANGE UP (ref 27–34)
MCHC RBC-ENTMCNC: 34.6 GM/DL — SIGNIFICANT CHANGE UP (ref 32–36)
MCV RBC AUTO: 96.7 FL — SIGNIFICANT CHANGE UP (ref 80–100)
NRBC # BLD: 0 /100 WBCS — SIGNIFICANT CHANGE UP (ref 0–0)
PHOSPHATE SERPL-MCNC: 2.6 MG/DL — SIGNIFICANT CHANGE UP (ref 2.5–4.5)
PLATELET # BLD AUTO: 184 K/UL — SIGNIFICANT CHANGE UP (ref 150–400)
POTASSIUM SERPL-MCNC: 3.8 MMOL/L — SIGNIFICANT CHANGE UP (ref 3.5–5.3)
POTASSIUM SERPL-SCNC: 3.8 MMOL/L — SIGNIFICANT CHANGE UP (ref 3.5–5.3)
RBC # BLD: 2.69 M/UL — LOW (ref 3.8–5.2)
RBC # FLD: 12 % — SIGNIFICANT CHANGE UP (ref 10.3–14.5)
SODIUM SERPL-SCNC: 130 MMOL/L — LOW (ref 135–145)
WBC # BLD: 12.34 K/UL — HIGH (ref 3.8–10.5)
WBC # FLD AUTO: 12.34 K/UL — HIGH (ref 3.8–10.5)

## 2021-10-21 RX ORDER — METRONIDAZOLE 500 MG
1 TABLET ORAL
Qty: 15 | Refills: 0
Start: 2021-10-21 | End: 2021-10-25

## 2021-10-21 RX ORDER — POTASSIUM CHLORIDE 20 MEQ
20 PACKET (EA) ORAL ONCE
Refills: 0 | Status: COMPLETED | OUTPATIENT
Start: 2021-10-21 | End: 2021-10-21

## 2021-10-21 RX ORDER — CEFPODOXIME PROXETIL 100 MG
1 TABLET ORAL
Qty: 10 | Refills: 0
Start: 2021-10-21 | End: 2021-10-25

## 2021-10-21 RX ADMIN — Medication 650 MILLIGRAM(S): at 10:18

## 2021-10-21 RX ADMIN — HEPARIN SODIUM 5000 UNIT(S): 5000 INJECTION INTRAVENOUS; SUBCUTANEOUS at 05:26

## 2021-10-21 RX ADMIN — Medication 100 MILLIGRAM(S): at 13:46

## 2021-10-21 RX ADMIN — Medication 650 MILLIGRAM(S): at 11:18

## 2021-10-21 RX ADMIN — Medication 650 MILLIGRAM(S): at 00:53

## 2021-10-21 RX ADMIN — ZOLPIDEM TARTRATE 5 MILLIGRAM(S): 10 TABLET ORAL at 00:54

## 2021-10-21 RX ADMIN — Medication 100 MILLIGRAM(S): at 05:30

## 2021-10-21 RX ADMIN — Medication 650 MILLIGRAM(S): at 01:53

## 2021-10-21 RX ADMIN — Medication 20 MILLIEQUIVALENT(S): at 10:19

## 2021-10-21 NOTE — DISCHARGE NOTE PROVIDER - CARE PROVIDER_API CALL
Krishna Morrow  SURGERY  [address] [place] [address]  Phone: [phone]  Fax: [phone]  Follow Up Time:

## 2021-10-21 NOTE — DISCHARGE NOTE NURSING/CASE MANAGEMENT/SOCIAL WORK - PATIENT PORTAL LINK FT
You can access the FollowMyHealth Patient Portal offered by [facility] by registering at the following website: [email or web address]. By joining [facility]’s FollowMyHealth portal, you will also be able to view your health information using other applications (apps) compatible with our system.

## 2021-10-21 NOTE — PROGRESS NOTE ADULT - ASSESSMENT
61F on hormone replacement therapy, psh diagnostic lap for ovarian cyst, a/w acute uncomplicated appendicitis. Now S/P lap appy 10/20.    Regular diet  F/u AM labs  CTX/flagyl  Analgesics PRN  Antiemetics PRN  VTE PPX with SCDs and SQH  DC today

## 2021-10-21 NOTE — DIETITIAN INITIAL EVALUATION ADULT. - ADD RECOMMEND
1. Continue with regular diet. Monitor %PO intake, diet tolerance. 2. Monitor skin/wts. 3. Pain and bowel regimen per team. 4. Monitor lytes and replete prn. 5. RD diet edu reinforcement prn.

## 2021-10-21 NOTE — DIETITIAN INITIAL EVALUATION ADULT. - OTHER INFO
61y F with PMHx of HSV, and Sx history of diagnostic lap for ovarian cyst present to [facility] with appendicitis after experiencing 2 days of abdominal pain w/ n/v. Now s/p appendectomy 10/20 abdominal pain and discomfort significantly improved. Pt pending medical clearance for d/c home.      On assessment, pt resting comfortably in her bed. Pt on regular diet and reports good PO intake, approximately eating 50-75% of her meals. No n/v reported. NKFA. Skin: sx incision on abdomen, Baldev 21. PTA pt reported good PO intake until abdominal pain began 2 days before admission. Pt reports usually eating 3x a day, mostly vegetarian, and includes vegetarian protein sources such as tofu and tempeh in her diet. Consumes fish and chicken, mostly on the weekends. UBW is 113-115, noting a 3–5-pound wt loss likely 2/2 abdominal pain and discomfort (3-4% wt loss, insignificant). Encouraged pt to go back to eating as usual/general healthy diet edu. pt was receptive. Please see nutrition recommendations below. 61y F with PMHx of HSV, and Sx history of diagnostic lap for ovarian cyst present to [facility] with appendicitis after experiencing 2 days of abdominal pain w/ n/v/Now s/p appendectomy 10/20 abdominal pain and discomfort significantly improved. Pt pending medical clearance for d/c home.      On assessment, pt resting comfortably in her bed. Pt on regular diet and reports good PO intake, approximately eating 50-75% of her meals. No n/vd. Last BM PTA - currently on bowel regimen. NKFA. Skin: sx incision on abdomen, Baldev 21. PTA pt reported good PO intake until abdominal pain began 2 days before admission. Per diet recall, pt had significant decrease of PO intake x2 days PTA. Pt reports usually eating 3x a day, mostly vegetarian, and includes vegetarian protein sources such as tofu and tempeh in her diet. Consumes fish and chicken, mostly on the weekends. UBW is 113-115, noting a 3–5-pound wt loss likely 2/2 abdominal pain and discomfort (3-4% wt loss < 1 week). NFPE unremarkable. Per ASPEN guidelines, meets criteria for mild malnutrition. Encouraged pt to go back to eating as usual/general healthy diet edu. pt was receptive. Please see nutrition recommendations below.

## 2021-10-21 NOTE — DISCHARGE NOTE PROVIDER - HOSPITAL COURSE
Alexandra Blank is a 61F pmh of HSV, osphena and psh of diagnostic laparoscopy who presents with 2 day history of abdominal pain with associated nausea with emesis. In the ED initial vitals were notable for  and /59, otherwise remaining vitals wnl. Initial labs notable for WBC 15.7 and Na 129. CT A/P distended appendix measuring 1.4 cm with fat stranding and thickened walls and without collection or free air. Was bolused 1L NS. On 10/20 pt was taken to the OR for lap appy, appendix was noted to be gangrenous but non perforated. Her postoperative course was unremarkable with advancement of diet, passing trial of void, and pain control. On day of discharge patient was stable to be d/c'd home with 5 more days of PO abx.     Alexandra Blank is a 61F pmh of HSV, osphena and psh of diagnostic laparoscopy who presents with 2 day history of abdominal pain with associated nausea with emesis. In the ED initial vitals were notable for  and /59, otherwise remaining vitals wnl. Initial labs notable for WBC 15.7 and Na 129. CT A/P distended appendix measuring 1.4 cm with fat stranding and thickened walls and without collection or free air. Was bolused 1L NS. On 10/20 pt was taken to the OR for lap appy, appendix was noted to be gangrenous but non perforated. Her postoperative course was unremarkable with advancement of diet, passing trial of void, and pain control. On day of discharge patient was stable to be d/c'd home with 5 more days of PO abx. This patient presented with clinical picture of acute suppurative appendicitis which correlated with intraoperative findings. Systemic sepsis was not present on admission based on clinical determination.

## 2021-10-21 NOTE — DISCHARGE NOTE PROVIDER - NSDCFUADDINST_GEN_ALL_CORE_FT
Detail Level: Zone Otc Regimen: [place] soaks daily\\nMineral oil daily Please follow up with Dr. Morrow in one week; you may call the office to make an appointment at your earliest convenience. You have been prescribed oral antibiotics. Please be sure to complete the entire course as directed. You may take tylenol every 6 hours as needed for pain, do not exceed 4,000mg of tylenol in 24 hours.    General Discharge Instructions:  Please resume all regular home medications unless specifically advised not to take a particular medication. Also, please take any new medications as prescribed.  Please get plenty of rest, continue to ambulate several times per day, and drink adequate amounts of fluids. Avoid lifting weights greater than 5-10 lbs until you follow-up with your surgeon, who will instruct you further regarding activity restrictions.  Please follow-up with your surgeon and Primary Care Provider (PCP) as advised.  Incision Care:  *Please call your doctor if you have increased pain, swelling, redness, or drainage from the incision site.  *Avoid swimming and baths until your follow-up appointment.  *You may shower, and wash surgical incisions with a mild soap and warm water. Gently pat the area dry.  *If you have steri-strips, they will fall off on their own. Please remove any remaining strips 7-10 days after surgery.    Warning Signs:  Please call your doctor if you experience the following:  *You experience new chest pain, pressure, squeezing or tightness.  *New or worsening cough, shortness of breath, or wheeze.  *If you are vomiting and cannot keep down fluids or your medications.  *You are getting dehydrated due to continued vomiting, diarrhea, or other reasons. Signs of dehydration include dry mouth, rapid heartbeat, or feeling dizzy or faint when standing.  *You see blood or dark/black material when you vomit or have a bowel movement.  *You experience burning when you urinate, have blood in your urine, or experience a discharge.  *Your pain is not improving within 8-12 hours or is not gone within 24 hours. Call or return immediately if your pain is getting worse, changes location, or moves to your chest or back.  *You have shaking chills, or fever greater than 101.5 degrees Fahrenheit or 38 degrees Celsius.  *Any change in your symptoms, or any new symptoms that concern you.     Please follow up with Dr. Morrow in one week; you may call the office to make an appointment at your earliest convenience. You have been prescribed oral antibiotics. Please be sure to complete the entire course as directed. You may take tylenol every 6 hours as needed for pain, do not exceed 4,000mg of tylenol in 24 hours.    Primary Care Follow Up:  Please follow up with your primary care doctor, Dr. Mayorga, in 2 weeks regarding the lung granulomas that you noted on your chest xray; you may call the office to make an appointment at your earliest convenience.      General Discharge Instructions:  Please resume all regular home medications unless specifically advised not to take a particular medication. Also, please take any new medications as prescribed.  Please get plenty of rest, continue to ambulate several times per day, and drink adequate amounts of fluids. Avoid lifting weights greater than 5-10 lbs until you follow-up with your surgeon, who will instruct you further regarding activity restrictions.  Please follow-up with your surgeon and Primary Care Provider (PCP) as advised.  Incision Care:  *Please call your doctor if you have increased pain, swelling, redness, or drainage from the incision site.  *Avoid swimming and baths until your follow-up appointment.  *You may shower, and wash surgical incisions with a mild soap and warm water. Gently pat the area dry.  *If you have steri-strips, they will fall off on their own. Please remove any remaining strips 7-10 days after surgery.    Warning Signs:  Please call your doctor if you experience the following:  *You experience new chest pain, pressure, squeezing or tightness.  *New or worsening cough, shortness of breath, or wheeze.  *If you are vomiting and cannot keep down fluids or your medications.  *You are getting dehydrated due to continued vomiting, diarrhea, or other reasons. Signs of dehydration include dry mouth, rapid heartbeat, or feeling dizzy or faint when standing.  *You see blood or dark/black material when you vomit or have a bowel movement.  *You experience burning when you urinate, have blood in your urine, or experience a discharge.  *Your pain is not improving within 8-12 hours or is not gone within 24 hours. Call or return immediately if your pain is getting worse, changes location, or moves to your chest or back.  *You have shaking chills, or fever greater than 101.5 degrees Fahrenheit or 38 degrees Celsius.  *Any change in your symptoms, or any new symptoms that concern you.

## 2021-10-21 NOTE — DIETITIAN INITIAL EVALUATION ADULT. - OTHER CALCULATIONS
%IBW = 100%; ABW used to calculate estimated needs due to pt's ABW being between 80% and 120% of IBW. %IBW = 100%; ABW used to calculate estimated needs due to pt's ABW being between 80% and 120% of IBW; needs slightly adjusted for mild malnutrition

## 2021-10-21 NOTE — DIETITIAN INITIAL EVALUATION ADULT. - PERTINENT LABORATORY DATA
Hemoglobin: 9.0 (L)   Hematocrit: 26.0 (L)  Na, serum: 130 (L)   BUN: 6 (L)   Glucose, serum: 114 (H)

## 2021-10-21 NOTE — DISCHARGE NOTE PROVIDER - NSDCMRMEDTOKEN_GEN_ALL_CORE_FT
cefpodoxime 200 mg oral tablet: 1 tab(s) orally 2 times a day   metroNIDAZOLE 500 mg oral tablet: 1 tab(s) orally every 8 hours   Osphena:   valACYclovir:    cefpodoxime 200 mg oral tablet: 1 tab(s) orally 2 times a day   COVID PCR NASAL SWAB: COVID PCR NASAL SWAB  metroNIDAZOLE 500 mg oral tablet: 1 tab(s) orally every 8 hours   Osphena:   valACYclovir:

## 2021-10-21 NOTE — DIETITIAN NUTRITION RISK NOTIFICATION - ADDITIONAL COMMENTS/DIETITIAN RECOMMENDATIONS
1. Cont with regular diet  2. Pain and bowel regimen per team  3. Cont to monitor lytes and replete prn  4. RD diet edu prn

## 2021-10-21 NOTE — DISCHARGE NOTE PROVIDER - DETAILS OF MALNUTRITION DIAGNOSIS/DIAGNOSES
This patient has been assessed with a concern for Malnutrition and was treated during this hospitalization for the following Nutrition diagnosis/diagnoses:     -  10/21/2021: Mild protein-calorie malnutrition

## 2021-10-21 NOTE — PROGRESS NOTE ADULT - SUBJECTIVE AND OBJECTIVE BOX
Procedure: Laparoscopic appendectomy    Diagnosis/Indication: Acute cholecystitis    Surgeon: Cristhian    S: Pt has no complaints. Denies CP, SOB, GIBSON, calf tenderness. Pain controlled with medication. States that abdominal discomfort and pain from this AM significantly improved after surgery    O:  T(C): 36.3 (10-20-21 @ 16:35), Max: 36.8 (10-20-21 @ 14:45)  T(F): 97.4 (10-20-21 @ 16:35), Max: 98.3 (10-20-21 @ 14:45)  HR: 96 (10-20-21 @ 16:35) (92 - 107)  BP: 136/72 (10-20-21 @ 16:35) (129/63 - 137/65)  RR: 17 (10-20-21 @ 16:35) (17 - 26)  SpO2: 96% (10-20-21 @ 16:35) (96% - 100%)  Wt(kg): --                        10.0   15.97 )-----------( 222      ( 20 Oct 2021 08:25 )             29.5     10-20    134<L>  |  101  |  5<L>  ----------------------------<  89  4.0   |  21<L>  |  0.60    Ca    8.8      20 Oct 2021 08:25  Phos  3.2     10-20  Mg     2.2     10-20    TPro  7.6  /  Alb  4.5  /  TBili  1.1  /  DBili  x   /  AST  24  /  ALT  13  /  AlkPhos  77  10-19      Gen: NAD, resting comfortably in bed  C/V: NSR  Pulm: Nonlabored breathing, no respiratory distress  Abd: soft, NT/ND, incision c/d/i with dermabond   Extrem: WWP, no calf edema, SCDs in place      A/P: 61yFemale s/p above procedure  Diet: CLD  IVF: LR 90  Pain/nausea control  DVT ppx: scds, sqh  Dispo plan: d/c home tmrw 
  Patient seen and examined bedside with chief resident, - flatus, + BM, - F/C/N/V    metroNIDAZOLE  IVPB 500 milliGRAM(s) IV Intermittent every 8 hours      Vital Signs Last 24 Hrs  T(C): 37.6 (20 Oct 2021 05:07), Max: 37.6 (20 Oct 2021 05:07)  T(F): 99.6 (20 Oct 2021 05:07), Max: 99.6 (20 Oct 2021 05:07)  HR: 97 (20 Oct 2021 05:07) (87 - 105)  BP: 105/57 (20 Oct 2021 05:07) (105/57 - 163/59)  BP(mean): --  RR: 17 (20 Oct 2021 05:07) (16 - 20)  SpO2: 97% (20 Oct 2021 05:07) (97% - 99%)    I&O's Detail    Physical Exam  General: NAD, resting comfortably in bed  C/V: NSR  Pulm: Nonlabored breathing, no respiratory distress  Abd: soft, mildly distended, moderately TTP in RLQ and LLQ, no guarding   Extrem: WWP, no edema  Skin: warm, no rashes  Psych: calm, appropriate    
GENERAL SURGERY PROGRESS NOTE    SUBJECTIVE: Patient seen and examined bedside. Doing well. No fevers chills, nausea, vomiting. NO SOB, chest pain, wheezing, lower extremity weakness. No acute events overnight. Tolerating clears. Exam below    cefTRIAXone   IVPB 1000 milliGRAM(s) IV Intermittent every 24 hours  heparin   Injectable 5000 Unit(s) SubCutaneous every 8 hours  metroNIDAZOLE  IVPB 500 milliGRAM(s) IV Intermittent every 8 hours      Vital Signs Last 24 Hrs  T(C): 36.9 (21 Oct 2021 04:57), Max: 36.9 (21 Oct 2021 04:57)  T(F): 98.5 (21 Oct 2021 04:57), Max: 98.5 (21 Oct 2021 04:57)  HR: 79 (21 Oct 2021 04:57) (79 - 107)  BP: 122/58 (21 Oct 2021 04:57) (122/58 - 137/65)  BP(mean): 89 (20 Oct 2021 15:30) (88 - 93)  RR: 16 (21 Oct 2021 04:57) (15 - 26)  SpO2: 98% (21 Oct 2021 04:57) (96% - 100%)  I&O's Detail    20 Oct 2021 07:01  -  21 Oct 2021 07:00  --------------------------------------------------------  IN:  Total IN: 0 mL    OUT:    Voided (mL): 1000 mL  Total OUT: 1000 mL    Total NET: -1000 mL          PHYSICAL EXAM    General: NAD, resting comfortably in bed  C/V: NSR  Pulm: Nonlabored breathing, no respiratory distress on room air  Abd: soft, ND, appropriate incisional tenderness, no rebound tenderness, no guarding  Extrem: WWP, no edema, SCDs in place        LABS:                        10.0   15.97 )-----------( 222      ( 20 Oct 2021 08:25 )             29.5     10-20    134<L>  |  101  |  5<L>  ----------------------------<  89  4.0   |  21<L>  |  0.60    Ca    8.8      20 Oct 2021 08:25  Phos  3.2     10-20  Mg     2.2     10-20    TPro  7.6  /  Alb  4.5  /  TBili  1.1  /  DBili  x   /  AST  24  /  ALT  13  /  AlkPhos  77  10-19    PT/INR - ( 19 Oct 2021 23:16 )   PT: 12.9 sec;   INR: 1.08          PTT - ( 19 Oct 2021 23:16 )  PTT:30.2 sec  Urinalysis Basic - ( 19 Oct 2021 19:11 )    Color: Yellow / Appearance: Clear / S.020 / pH: x  Gluc: x / Ketone: >=80 mg/dL  / Bili: Negative / Urobili: 0.2 E.U./dL   Blood: x / Protein: NEGATIVE mg/dL / Nitrite: NEGATIVE   Leuk Esterase: Trace / RBC: < 5 /HPF / WBC < 5 /HPF   Sq Epi: x / Non Sq Epi: 0-5 /HPF / Bacteria: None /HPF        RADIOLOGY & ADDITIONAL STUDIES:

## 2021-10-22 ENCOUNTER — NON-APPOINTMENT (OUTPATIENT)
Age: 61
End: 2021-10-22

## 2021-10-25 LAB
CULTURE RESULTS: SIGNIFICANT CHANGE UP
CULTURE RESULTS: SIGNIFICANT CHANGE UP
SPECIMEN SOURCE: SIGNIFICANT CHANGE UP
SPECIMEN SOURCE: SIGNIFICANT CHANGE UP

## 2021-10-29 PROBLEM — B00.9 HERPESVIRAL INFECTION, UNSPECIFIED: Chronic | Status: ACTIVE | Noted: 2021-10-20

## 2021-10-29 LAB — SURGICAL PATHOLOGY STUDY: SIGNIFICANT CHANGE UP

## 2021-11-02 DIAGNOSIS — B00.9 HERPESVIRAL INFECTION, UNSPECIFIED: ICD-10-CM

## 2021-11-02 DIAGNOSIS — K35.891 OTHER ACUTE APPENDICITIS WITHOUT PERFORATION, WITH GANGRENE: ICD-10-CM

## 2021-11-02 DIAGNOSIS — E87.1 HYPO-OSMOLALITY AND HYPONATREMIA: ICD-10-CM

## 2021-11-02 DIAGNOSIS — Z88.5 ALLERGY STATUS TO NARCOTIC AGENT: ICD-10-CM

## 2021-11-02 DIAGNOSIS — K35.80 UNSPECIFIED ACUTE APPENDICITIS: ICD-10-CM

## 2021-11-02 PROCEDURE — 86901 BLOOD TYPING SEROLOGIC RH(D): CPT

## 2021-11-02 PROCEDURE — 87040 BLOOD CULTURE FOR BACTERIA: CPT

## 2021-11-02 PROCEDURE — 71045 X-RAY EXAM CHEST 1 VIEW: CPT

## 2021-11-02 PROCEDURE — 84100 ASSAY OF PHOSPHORUS: CPT

## 2021-11-02 PROCEDURE — 83735 ASSAY OF MAGNESIUM: CPT

## 2021-11-02 PROCEDURE — 85730 THROMBOPLASTIN TIME PARTIAL: CPT

## 2021-11-02 PROCEDURE — 88304 TISSUE EXAM BY PATHOLOGIST: CPT

## 2021-11-02 PROCEDURE — 85027 COMPLETE CBC AUTOMATED: CPT

## 2021-11-02 PROCEDURE — 99285 EMERGENCY DEPT VISIT HI MDM: CPT | Mod: 25

## 2021-11-02 PROCEDURE — 86900 BLOOD TYPING SEROLOGIC ABO: CPT

## 2021-11-02 PROCEDURE — 80048 BASIC METABOLIC PNL TOTAL CA: CPT

## 2021-11-02 PROCEDURE — 83605 ASSAY OF LACTIC ACID: CPT

## 2021-11-02 PROCEDURE — C1889: CPT

## 2021-11-02 PROCEDURE — 86850 RBC ANTIBODY SCREEN: CPT

## 2021-11-02 PROCEDURE — 81001 URINALYSIS AUTO W/SCOPE: CPT

## 2021-11-02 PROCEDURE — 86769 SARS-COV-2 COVID-19 ANTIBODY: CPT

## 2021-11-02 PROCEDURE — 87635 SARS-COV-2 COVID-19 AMP PRB: CPT

## 2021-11-02 PROCEDURE — 36415 COLL VENOUS BLD VENIPUNCTURE: CPT

## 2021-11-02 PROCEDURE — 74177 CT ABD & PELVIS W/CONTRAST: CPT | Mod: MG

## 2021-11-02 PROCEDURE — 96374 THER/PROPH/DIAG INJ IV PUSH: CPT

## 2021-11-02 PROCEDURE — 96375 TX/PRO/DX INJ NEW DRUG ADDON: CPT

## 2021-11-02 PROCEDURE — 83690 ASSAY OF LIPASE: CPT

## 2021-11-02 PROCEDURE — G1004: CPT

## 2021-11-02 PROCEDURE — 85610 PROTHROMBIN TIME: CPT

## 2021-11-02 PROCEDURE — 86803 HEPATITIS C AB TEST: CPT

## 2021-11-02 PROCEDURE — 80053 COMPREHEN METABOLIC PANEL: CPT

## 2021-12-20 ENCOUNTER — APPOINTMENT (OUTPATIENT)
Dept: INTERNAL MEDICINE | Facility: CLINIC | Age: 61
End: 2021-12-20
Payer: COMMERCIAL

## 2021-12-20 ENCOUNTER — NON-APPOINTMENT (OUTPATIENT)
Age: 61
End: 2021-12-20

## 2021-12-20 VITALS
SYSTOLIC BLOOD PRESSURE: 125 MMHG | OXYGEN SATURATION: 98 % | HEIGHT: 62 IN | TEMPERATURE: 97.6 F | DIASTOLIC BLOOD PRESSURE: 72 MMHG | BODY MASS INDEX: 20.61 KG/M2 | HEART RATE: 79 BPM | WEIGHT: 112 LBS

## 2021-12-20 DIAGNOSIS — Z80.0 FAMILY HISTORY OF MALIGNANT NEOPLASM OF DIGESTIVE ORGANS: ICD-10-CM

## 2021-12-20 DIAGNOSIS — Z00.00 ENCOUNTER FOR GENERAL ADULT MEDICAL EXAMINATION W/OUT ABNORMAL FINDINGS: ICD-10-CM

## 2021-12-20 DIAGNOSIS — Z86.010 PERSONAL HISTORY OF COLONIC POLYPS: ICD-10-CM

## 2021-12-20 DIAGNOSIS — E61.1 IRON DEFICIENCY: ICD-10-CM

## 2021-12-20 DIAGNOSIS — Z80.3 FAMILY HISTORY OF MALIGNANT NEOPLASM OF BREAST: ICD-10-CM

## 2021-12-20 PROCEDURE — 36415 COLL VENOUS BLD VENIPUNCTURE: CPT

## 2021-12-20 PROCEDURE — 93000 ELECTROCARDIOGRAM COMPLETE: CPT

## 2021-12-20 PROCEDURE — 99396 PREV VISIT EST AGE 40-64: CPT | Mod: 25

## 2021-12-20 RX ORDER — MAGNESIUM SULFATE 100 MG
CAPSULE ORAL
Refills: 0 | Status: ACTIVE | COMMUNITY

## 2021-12-20 NOTE — HEALTH RISK ASSESSMENT
[Never] : Never [Yes] : Yes [Monthly or less (1 pt)] : Monthly or less (1 point) [1 or 2 (0 pts)] : 1 or 2 (0 points) [Never (0 pts)] : Never (0 points) [No] : In the past 12 months have you used drugs other than those required for medical reasons? No [0] : 2) Feeling down, depressed, or hopeless: Not at all (0) [PHQ-2 Negative - No further assessment needed] : PHQ-2 Negative - No further assessment needed [Patient reported mammogram was normal] : Patient reported mammogram was normal [Patient reported PAP Smear was normal] : Patient reported PAP Smear was normal [Patient reported bone density results were abnormal] : Patient reported bone density results were abnormal [Patient reported colonoscopy was normal] : Patient reported colonoscopy was normal [HIV test declined] : HIV test declined [Hepatitis C test declined] : Hepatitis C test declined [With Family] : lives with family [Employed] : employed [] :  [Sexually Active] : sexually active [Feels Safe at Home] : Feels safe at home [Fully functional (bathing, dressing, toileting, transferring, walking, feeding)] : Fully functional (bathing, dressing, toileting, transferring, walking, feeding) [Fully functional (using the telephone, shopping, preparing meals, housekeeping, doing laundry, using] : Fully functional and needs no help or supervision to perform IADLs (using the telephone, shopping, preparing meals, housekeeping, doing laundry, using transportation, managing medications and managing finances) [Smoke Detector] : smoke detector [Carbon Monoxide Detector] : carbon monoxide detector [Seat Belt] :  uses seat belt [Sunscreen] : uses sunscreen [With Patient/Caregiver] : , with patient/caregiver [Designated Healthcare Proxy] : Designated healthcare proxy [Name: ___] : Health Care Proxy's Name: [unfilled]  [Relationship: ___] : Relationship: [unfilled] [I will adhere to the patient's wishes.] : I will adhere to the patient's wishes. 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

## 2021-12-20 NOTE — PHYSICAL EXAM
[Normal Sclera/Conjunctiva] : normal sclera/conjunctiva [No Lymphadenopathy] : no lymphadenopathy [Clear to Auscultation] : lungs were clear to auscultation bilaterally [Normal Rate] : normal rate  [Regular Rhythm] : with a regular rhythm [Pedal Pulses Present] : the pedal pulses are present [No Edema] : there was no peripheral edema [Soft] : abdomen soft [Non Tender] : non-tender [Normal Supraclavicular Nodes] : no supraclavicular lymphadenopathy [Normal Axillary Nodes] : no axillary lymphadenopathy [Normal Posterior Cervical Nodes] : no posterior cervical lymphadenopathy [Normal Anterior Cervical Nodes] : no anterior cervical lymphadenopathy [No CVA Tenderness] : no CVA  tenderness [No Rash] : no rash [Normal] : affect was normal and insight and judgment were intact [Declined Breast Exam] : declined breast exam

## 2021-12-21 LAB
25(OH)D3 SERPL-MCNC: 44.6 NG/ML
ALBUMIN SERPL ELPH-MCNC: 4.2 G/DL
ALP BLD-CCNC: 65 U/L
ALT SERPL-CCNC: 12 U/L
ANION GAP SERPL CALC-SCNC: 11 MMOL/L
APPEARANCE: CLEAR
AST SERPL-CCNC: 21 U/L
BACTERIA: NEGATIVE
BASOPHILS # BLD AUTO: 0.02 K/UL
BASOPHILS NFR BLD AUTO: 0.4 %
BILIRUB SERPL-MCNC: 0.3 MG/DL
BILIRUBIN URINE: NEGATIVE
BLOOD URINE: NEGATIVE
BUN SERPL-MCNC: 13 MG/DL
CALCIUM SERPL-MCNC: 9.2 MG/DL
CHLORIDE SERPL-SCNC: 102 MMOL/L
CHOLEST SERPL-MCNC: 198 MG/DL
CO2 SERPL-SCNC: 25 MMOL/L
COLOR: COLORLESS
CREAT SERPL-MCNC: 0.61 MG/DL
EOSINOPHIL # BLD AUTO: 0.05 K/UL
EOSINOPHIL NFR BLD AUTO: 0.9 %
ESTIMATED AVERAGE GLUCOSE: 97 MG/DL
FOLATE SERPL-MCNC: 12.1 NG/ML
GLUCOSE QUALITATIVE U: NEGATIVE
GLUCOSE SERPL-MCNC: 101 MG/DL
HBA1C MFR BLD HPLC: 5 %
HCT VFR BLD CALC: 33.4 %
HDLC SERPL-MCNC: 117 MG/DL
HGB BLD-MCNC: 11.2 G/DL
HYALINE CASTS: 0 /LPF
IMM GRANULOCYTES NFR BLD AUTO: 0.2 %
KETONES URINE: NEGATIVE
LDLC SERPL CALC-MCNC: 69 MG/DL
LEUKOCYTE ESTERASE URINE: ABNORMAL
LYMPHOCYTES # BLD AUTO: 1.71 K/UL
LYMPHOCYTES NFR BLD AUTO: 32.4 %
MAN DIFF?: NORMAL
MCHC RBC-ENTMCNC: 33.3 PG
MCHC RBC-ENTMCNC: 33.5 GM/DL
MCV RBC AUTO: 99.4 FL
MICROSCOPIC-UA: NORMAL
MONOCYTES # BLD AUTO: 0.52 K/UL
MONOCYTES NFR BLD AUTO: 9.9 %
NEUTROPHILS # BLD AUTO: 2.96 K/UL
NEUTROPHILS NFR BLD AUTO: 56.2 %
NITRITE URINE: NEGATIVE
NONHDLC SERPL-MCNC: 82 MG/DL
PH URINE: 7
PLATELET # BLD AUTO: 268 K/UL
POTASSIUM SERPL-SCNC: 4.2 MMOL/L
PROT SERPL-MCNC: 6.4 G/DL
PROTEIN URINE: NEGATIVE
RBC # BLD: 3.36 M/UL
RBC # FLD: 13.1 %
RED BLOOD CELLS URINE: 0 /HPF
SODIUM SERPL-SCNC: 138 MMOL/L
SPECIFIC GRAVITY URINE: 1
SQUAMOUS EPITHELIAL CELLS: 1 /HPF
T PALLIDUM AB SER QL IA: NEGATIVE
TRIGL SERPL-MCNC: 66 MG/DL
TSH SERPL-ACNC: 0.82 UIU/ML
UROBILINOGEN URINE: NORMAL
VIT B12 SERPL-MCNC: 466 PG/ML
WBC # FLD AUTO: 5.27 K/UL
WHITE BLOOD CELLS URINE: 0 /HPF

## 2021-12-23 ENCOUNTER — NON-APPOINTMENT (OUTPATIENT)
Age: 61
End: 2021-12-23

## 2021-12-24 LAB — HEMOCCULT STL QL IA: NEGATIVE

## 2021-12-31 ENCOUNTER — NON-APPOINTMENT (OUTPATIENT)
Age: 61
End: 2021-12-31

## 2022-03-31 ENCOUNTER — APPOINTMENT (OUTPATIENT)
Dept: ENDOCRINOLOGY | Facility: CLINIC | Age: 62
End: 2022-03-31
Payer: COMMERCIAL

## 2022-03-31 VITALS
SYSTOLIC BLOOD PRESSURE: 132 MMHG | OXYGEN SATURATION: 96 % | WEIGHT: 110 LBS | BODY MASS INDEX: 20.24 KG/M2 | HEIGHT: 62 IN | DIASTOLIC BLOOD PRESSURE: 68 MMHG | HEART RATE: 92 BPM

## 2022-03-31 DIAGNOSIS — M81.0 AGE-RELATED OSTEOPOROSIS W/OUT CURRENT PATHOLOGICAL FRACTURE: ICD-10-CM

## 2022-03-31 PROCEDURE — 99204 OFFICE O/P NEW MOD 45 MIN: CPT

## 2022-04-01 LAB
25(OH)D3 SERPL-MCNC: 44.7 NG/ML
ALBUMIN SERPL ELPH-MCNC: 4.7 G/DL
CALCIUM SERPL-MCNC: 9.7 MG/DL
CALCIUM SERPL-MCNC: 9.7 MG/DL
CREAT SERPL-MCNC: 0.63 MG/DL
EGFR: 101 ML/MIN/1.73M2
MAGNESIUM SERPL-MCNC: 2.3 MG/DL
PARATHYROID HORMONE INTACT: 20 PG/ML
PHOSPHATE SERPL-MCNC: 4.2 MG/DL

## 2022-06-02 NOTE — PRE-OP CHECKLIST - NOTHING BY MOUTH SINCE
Received patient from triage [id number]. Pt ambulated to the room with a steady gait. Patient came from home complaining of diarreah for 5 days. Pt denies blood, fevers, nausea or vomiting       
20-Oct-2021 00:00

## 2022-08-25 ENCOUNTER — APPOINTMENT (OUTPATIENT)
Dept: INTERNAL MEDICINE | Facility: CLINIC | Age: 62
End: 2022-08-25

## 2022-08-25 ENCOUNTER — NON-APPOINTMENT (OUTPATIENT)
Age: 62
End: 2022-08-25

## 2022-08-25 VITALS
BODY MASS INDEX: 20.8 KG/M2 | OXYGEN SATURATION: 98 % | HEART RATE: 89 BPM | HEIGHT: 62 IN | TEMPERATURE: 97.9 F | DIASTOLIC BLOOD PRESSURE: 70 MMHG | WEIGHT: 113 LBS | SYSTOLIC BLOOD PRESSURE: 130 MMHG

## 2022-08-25 DIAGNOSIS — Z83.49 FAMILY HISTORY OF OTHER ENDOCRINE, NUTRITIONAL AND METABOLIC DISEASES: ICD-10-CM

## 2022-08-25 DIAGNOSIS — Z86.19 PERSONAL HISTORY OF OTHER INFECTIOUS AND PARASITIC DISEASES: ICD-10-CM

## 2022-08-25 DIAGNOSIS — R53.83 OTHER FATIGUE: ICD-10-CM

## 2022-08-25 PROCEDURE — 36415 COLL VENOUS BLD VENIPUNCTURE: CPT

## 2022-08-25 PROCEDURE — 99214 OFFICE O/P EST MOD 30 MIN: CPT | Mod: 25

## 2022-08-25 PROCEDURE — 93000 ELECTROCARDIOGRAM COMPLETE: CPT

## 2022-08-25 RX ORDER — IRON/IRON ASP GLY/FA/MV-MIN 38 125-25-1MG
TABLET ORAL
Refills: 0 | Status: ACTIVE | COMMUNITY

## 2022-08-25 NOTE — PHYSICAL EXAM
[Normal Sclera/Conjunctiva] : normal sclera/conjunctiva [Clear to Auscultation] : lungs were clear to auscultation bilaterally [Normal Rate] : normal rate  [Regular Rhythm] : with a regular rhythm [Pedal Pulses Present] : the pedal pulses are present [No Edema] : there was no peripheral edema [Soft] : abdomen soft [Non Tender] : non-tender [No CVA Tenderness] : no CVA  tenderness [No Rash] : no rash [Normal] : affect was normal and insight and judgment were intact

## 2022-08-26 DIAGNOSIS — R79.89 OTHER SPECIFIED ABNORMAL FINDINGS OF BLOOD CHEMISTRY: ICD-10-CM

## 2022-08-26 LAB
ALBUMIN SERPL ELPH-MCNC: 4.6 G/DL
ALP BLD-CCNC: 61 U/L
ALT SERPL-CCNC: 16 U/L
ANION GAP SERPL CALC-SCNC: 10 MMOL/L
AST SERPL-CCNC: 22 U/L
BASOPHILS # BLD AUTO: 0.02 K/UL
BASOPHILS NFR BLD AUTO: 0.3 %
BILIRUB SERPL-MCNC: 0.3 MG/DL
BUN SERPL-MCNC: 13 MG/DL
CALCIUM SERPL-MCNC: 9.6 MG/DL
CHLORIDE SERPL-SCNC: 98 MMOL/L
CHOLEST SERPL-MCNC: 204 MG/DL
CO2 SERPL-SCNC: 24 MMOL/L
COVID-19 NUCLEOCAPSID  GAM ANTIBODY INTERPRETATION: NEGATIVE
CREAT SERPL-MCNC: 0.6 MG/DL
EGFR: 103 ML/MIN/1.73M2
EOSINOPHIL # BLD AUTO: 0.17 K/UL
EOSINOPHIL NFR BLD AUTO: 2.5 %
ESTIMATED AVERAGE GLUCOSE: 103 MG/DL
FOLATE SERPL-MCNC: 17.5 NG/ML
GLUCOSE SERPL-MCNC: 108 MG/DL
HBA1C MFR BLD HPLC: 5.2 %
HCT VFR BLD CALC: 34.1 %
HDLC SERPL-MCNC: 108 MG/DL
HGB BLD-MCNC: 11.7 G/DL
IMM GRANULOCYTES NFR BLD AUTO: 0.4 %
LDLC SERPL CALC-MCNC: 80 MG/DL
LYMPHOCYTES # BLD AUTO: 1.56 K/UL
LYMPHOCYTES NFR BLD AUTO: 23.1 %
MAN DIFF?: NORMAL
MCHC RBC-ENTMCNC: 32.7 PG
MCHC RBC-ENTMCNC: 34.3 GM/DL
MCV RBC AUTO: 95.3 FL
MONOCYTES # BLD AUTO: 0.68 K/UL
MONOCYTES NFR BLD AUTO: 10.1 %
NEUTROPHILS # BLD AUTO: 4.28 K/UL
NEUTROPHILS NFR BLD AUTO: 63.6 %
NONHDLC SERPL-MCNC: 97 MG/DL
PLATELET # BLD AUTO: 269 K/UL
POTASSIUM SERPL-SCNC: 4.6 MMOL/L
PROT SERPL-MCNC: 6.7 G/DL
RBC # BLD: 3.58 M/UL
RBC # FLD: 11.6 %
SARS-COV-2 AB SERPL QL IA: 0.09 INDEX
SODIUM SERPL-SCNC: 132 MMOL/L
T PALLIDUM AB SER QL IA: NEGATIVE
TRIGL SERPL-MCNC: 81 MG/DL
TSH SERPL-ACNC: 0.86 UIU/ML
VIT B12 SERPL-MCNC: 479 PG/ML
WBC # FLD AUTO: 6.74 K/UL

## 2022-08-27 PROBLEM — R53.83 FATIGUE, UNSPECIFIED TYPE: Status: ACTIVE | Noted: 2022-08-25

## 2022-08-27 LAB
APPEARANCE: CLEAR
BACTERIA: NEGATIVE
BILIRUBIN URINE: NEGATIVE
BLOOD URINE: NEGATIVE
COLOR: COLORLESS
CRP SERPL-MCNC: <3 MG/L
ERYTHROCYTE [SEDIMENTATION RATE] IN BLOOD BY WESTERGREN METHOD: 5 MM/HR
GLUCOSE QUALITATIVE U: NEGATIVE
HYALINE CASTS: 1 /LPF
KETONES URINE: NEGATIVE
LEUKOCYTE ESTERASE URINE: NEGATIVE
MICROSCOPIC-UA: NORMAL
NITRITE URINE: NEGATIVE
PH URINE: 7
PROTEIN URINE: NEGATIVE
RED BLOOD CELLS URINE: 1 /HPF
RHEUMATOID FACT SER QL: 11 IU/ML
SPECIFIC GRAVITY URINE: 1
SQUAMOUS EPITHELIAL CELLS: 1 /HPF
UROBILINOGEN URINE: NORMAL
WHITE BLOOD CELLS URINE: 0 /HPF

## 2022-08-27 NOTE — HISTORY OF PRESENT ILLNESS
[FreeTextEntry8] : c/o sudden loss of energy - "I suddenly feel zapped of all my energy lasting 24 hrs" x 3 episodes \par "makes it hard to walk the dog" "I just need to stay home"\par once a week x 3 weeks\par last episode 4 days ago\par no diuretics/new medications/change in dose \par no CP/palpitations/SOB/dizziness\par no HA, blurred vision, slurred speech, disturbed gait, facial/UE/LE numbness/weakness\par no fever, anosmia, sore throat, cough, pl CP, nasal congestion/rhinorrhea/ nvd, myalgias/arthralgias, calf pain/swelling \par no polydipsia/polyuria \par no nvd\par no hematuria/vaginal bleeding\par no BRBPR/unexplained weight loss \par no rash/tick bites\par no h/o DM, thyroid disease, HTN, paroxysmal AFib/arrhythmia\par no h/o TIA\par no h/o anxiety/depression\par no h/o COVID- completed vaccine x 3\par h/o MVP - saw Cardio > 25 yrs ago\par h/o chronic iron def > 20 yrs as per pt - dx? - tx iron tabs- last colonoscopy 11/2020 serrated polyp- scheduled for colonoscopy 2023- has not Heme\par h/o EBV x 2 in past \par h/o low vitamin D \par h/o post menopausal sx - on Estrogen on Osphena \par 2021 PAP normal \par 2022 mammogram normal \par 2020 colonoscopy- benign polyps- FH colon cancer \par OTC none\par work writer

## 2022-08-29 ENCOUNTER — NON-APPOINTMENT (OUTPATIENT)
Age: 62
End: 2022-08-29

## 2022-08-29 DIAGNOSIS — E87.1 HYPO-OSMOLALITY AND HYPONATREMIA: ICD-10-CM

## 2022-08-29 LAB
ANA SER IF-ACNC: NEGATIVE
HETEROPH AB SER QL: NEGATIVE

## 2022-09-01 LAB — B BURGDOR DNA SPEC QL NAA+PROBE: NEGATIVE

## 2022-09-06 ENCOUNTER — RX RENEWAL (OUTPATIENT)
Age: 62
End: 2022-09-06

## 2022-10-31 DIAGNOSIS — Z01.812 ENCOUNTER FOR PREPROCEDURAL LABORATORY EXAMINATION: ICD-10-CM

## 2022-10-31 DIAGNOSIS — Z20.822 ENCOUNTER FOR PREPROCEDURAL LABORATORY EXAMINATION: ICD-10-CM

## 2022-10-31 RX ORDER — POLYETHYLENE GLYCOL 3350, SODIUM CHLORIDE, SODIUM BICARBONATE AND POTASSIUM CHLORIDE WITH LEMON FLAVOR 420; 11.2; 5.72; 1.48 G/4L; G/4L; G/4L; G/4L
420 POWDER, FOR SOLUTION ORAL
Qty: 1 | Refills: 0 | Status: ACTIVE | COMMUNITY
Start: 2022-10-31 | End: 1900-01-01

## 2022-11-14 LAB — SARS-COV-2 N GENE NPH QL NAA+PROBE: NOT DETECTED

## 2022-11-16 ENCOUNTER — APPOINTMENT (OUTPATIENT)
Dept: COLORECTAL SURGERY | Facility: AMBULATORY SURGERY CENTER | Age: 62
End: 2022-11-16

## 2022-11-16 ENCOUNTER — RESULT REVIEW (OUTPATIENT)
Age: 62
End: 2022-11-16

## 2022-11-16 PROCEDURE — 45380 COLONOSCOPY AND BIOPSY: CPT

## 2022-11-22 ENCOUNTER — NON-APPOINTMENT (OUTPATIENT)
Age: 62
End: 2022-11-22

## 2022-12-26 ENCOUNTER — NON-APPOINTMENT (OUTPATIENT)
Age: 62
End: 2022-12-26

## 2023-02-21 ENCOUNTER — RX RENEWAL (OUTPATIENT)
Age: 63
End: 2023-02-21

## 2023-05-02 ENCOUNTER — TRANSCRIPTION ENCOUNTER (OUTPATIENT)
Age: 63
End: 2023-05-02

## 2023-08-11 ENCOUNTER — TRANSCRIPTION ENCOUNTER (OUTPATIENT)
Age: 63
End: 2023-08-11

## 2023-08-18 ENCOUNTER — APPOINTMENT (OUTPATIENT)
Dept: ENDOCRINOLOGY | Facility: CLINIC | Age: 63
End: 2023-08-18
Payer: COMMERCIAL

## 2023-08-18 VITALS
OXYGEN SATURATION: 100 % | TEMPERATURE: 97.9 F | HEART RATE: 78 BPM | DIASTOLIC BLOOD PRESSURE: 65 MMHG | SYSTOLIC BLOOD PRESSURE: 119 MMHG | HEIGHT: 62 IN | RESPIRATION RATE: 14 BRPM | WEIGHT: 116 LBS | BODY MASS INDEX: 21.35 KG/M2

## 2023-08-18 PROCEDURE — 99214 OFFICE O/P EST MOD 30 MIN: CPT

## 2023-08-21 LAB
25(OH)D3 SERPL-MCNC: 45.3 NG/ML
ALBUMIN SERPL ELPH-MCNC: 4.6 G/DL
ALP BLD-CCNC: 53 U/L
ALT SERPL-CCNC: 16 U/L
AST SERPL-CCNC: 26 U/L
BILIRUB DIRECT SERPL-MCNC: 0.1 MG/DL
BILIRUB INDIRECT SERPL-MCNC: 0.2 MG/DL
BILIRUB SERPL-MCNC: 0.4 MG/DL
CALCIUM SERPL-MCNC: 9.4 MG/DL
CREAT SERPL-MCNC: 0.57 MG/DL
EGFR: 103 ML/MIN/1.73M2
PROT SERPL-MCNC: 7.2 G/DL

## 2023-12-26 ENCOUNTER — APPOINTMENT (OUTPATIENT)
Dept: ENDOCRINOLOGY | Facility: CLINIC | Age: 63
End: 2023-12-26
Payer: COMMERCIAL

## 2023-12-26 VITALS
WEIGHT: 113.5 LBS | BODY MASS INDEX: 20.89 KG/M2 | DIASTOLIC BLOOD PRESSURE: 80 MMHG | HEIGHT: 62 IN | SYSTOLIC BLOOD PRESSURE: 118 MMHG | HEART RATE: 82 BPM

## 2023-12-26 DIAGNOSIS — E55.9 VITAMIN D DEFICIENCY, UNSPECIFIED: ICD-10-CM

## 2023-12-26 DIAGNOSIS — M81.0 AGE-RELATED OSTEOPOROSIS W/OUT CURRENT PATHOLOGICAL FRACTURE: ICD-10-CM

## 2023-12-26 PROCEDURE — 99214 OFFICE O/P EST MOD 30 MIN: CPT

## 2023-12-26 RX ORDER — OSPEMIFENE 60 MG/1
60 TABLET, FILM COATED ORAL
Refills: 0 | Status: COMPLETED | COMMUNITY
End: 2023-12-26

## 2023-12-26 RX ORDER — FINASTERIDE 5 MG/1
5 TABLET, FILM COATED ORAL
Refills: 0 | Status: ACTIVE | COMMUNITY

## 2024-01-16 ENCOUNTER — RX RENEWAL (OUTPATIENT)
Age: 64
End: 2024-01-16

## 2024-04-15 RX ORDER — ALENDRONATE SODIUM 70 MG/1
70 TABLET ORAL
Qty: 12 | Refills: 0 | Status: COMPLETED | COMMUNITY
Start: 2022-04-01 | End: 2024-04-15

## 2024-09-03 ENCOUNTER — APPOINTMENT (OUTPATIENT)
Dept: ENDOCRINOLOGY | Facility: CLINIC | Age: 64
End: 2024-09-03
Payer: COMMERCIAL

## 2024-09-03 VITALS
BODY MASS INDEX: 20.3 KG/M2 | SYSTOLIC BLOOD PRESSURE: 152 MMHG | WEIGHT: 111 LBS | HEART RATE: 82 BPM | DIASTOLIC BLOOD PRESSURE: 77 MMHG

## 2024-09-03 DIAGNOSIS — E55.9 VITAMIN D DEFICIENCY, UNSPECIFIED: ICD-10-CM

## 2024-09-03 DIAGNOSIS — M81.0 AGE-RELATED OSTEOPOROSIS W/OUT CURRENT PATHOLOGICAL FRACTURE: ICD-10-CM

## 2024-09-03 PROCEDURE — 99214 OFFICE O/P EST MOD 30 MIN: CPT

## 2024-09-03 PROCEDURE — G2211 COMPLEX E/M VISIT ADD ON: CPT

## 2024-10-15 NOTE — DISCHARGE NOTE PROVIDER - NSDCQMPCI_CARD_ALL_CORE
Recovery completed, pt tolerated well. No apparent distress noted. Dressing CDI. Discharge instructions reviewed and acknowledged. Pt discharged via ambulation, steady gait observed and private vehicle.      No